# Patient Record
Sex: MALE | Race: OTHER | Employment: OTHER | URBAN - METROPOLITAN AREA
[De-identification: names, ages, dates, MRNs, and addresses within clinical notes are randomized per-mention and may not be internally consistent; named-entity substitution may affect disease eponyms.]

---

## 2020-01-01 ENCOUNTER — HOSPITAL ENCOUNTER (EMERGENCY)
Facility: HOSPITAL | Age: 85
Discharge: HOME | End: 2020-01-01
Attending: EMERGENCY MEDICINE
Payer: MEDICARE

## 2020-01-01 VITALS
DIASTOLIC BLOOD PRESSURE: 70 MMHG | TEMPERATURE: 99 F | BODY MASS INDEX: 36 KG/M2 | WEIGHT: 222 LBS | RESPIRATION RATE: 20 BRPM | OXYGEN SATURATION: 97 % | SYSTOLIC BLOOD PRESSURE: 162 MMHG | HEART RATE: 69 BPM

## 2020-01-01 DIAGNOSIS — N30.00 ACUTE CYSTITIS WITHOUT HEMATURIA: Primary | ICD-10-CM

## 2020-01-01 PROCEDURE — 99284 EMERGENCY DEPT VISIT MOD MDM: CPT

## 2020-01-01 PROCEDURE — 51702 INSERT TEMP BLADDER CATH: CPT

## 2020-01-01 PROCEDURE — 87184 SC STD DISK METHOD PER PLATE: CPT | Performed by: EMERGENCY MEDICINE

## 2020-01-01 PROCEDURE — 87186 SC STD MICRODIL/AGAR DIL: CPT | Performed by: EMERGENCY MEDICINE

## 2020-01-01 PROCEDURE — 87077 CULTURE AEROBIC IDENTIFY: CPT | Performed by: EMERGENCY MEDICINE

## 2020-01-01 PROCEDURE — 81001 URINALYSIS AUTO W/SCOPE: CPT | Performed by: EMERGENCY MEDICINE

## 2020-01-01 PROCEDURE — 87086 URINE CULTURE/COLONY COUNT: CPT | Performed by: EMERGENCY MEDICINE

## 2020-01-01 RX ORDER — CEPHALEXIN 500 MG/1
500 CAPSULE ORAL 2 TIMES DAILY
Qty: 10 CAPSULE | Refills: 0 | Status: SHIPPED | OUTPATIENT
Start: 2020-01-01 | End: 2020-01-01

## 2020-06-25 ENCOUNTER — HOSPITAL ENCOUNTER (EMERGENCY)
Facility: HOSPITAL | Age: 85
Discharge: HOME | End: 2020-06-25
Attending: EMERGENCY MEDICINE
Payer: MEDICARE

## 2020-06-25 VITALS
HEIGHT: 66 IN | DIASTOLIC BLOOD PRESSURE: 67 MMHG | BODY MASS INDEX: 33.75 KG/M2 | TEMPERATURE: 98 F | SYSTOLIC BLOOD PRESSURE: 119 MMHG | WEIGHT: 210 LBS | HEART RATE: 62 BPM | RESPIRATION RATE: 18 BRPM | OXYGEN SATURATION: 96 %

## 2020-06-25 DIAGNOSIS — R31.9 HEMATURIA, UNSPECIFIED TYPE: Primary | ICD-10-CM

## 2020-06-25 DIAGNOSIS — T83.9XXA PROBLEM WITH FOLEY CATHETER, INITIAL ENCOUNTER (HCC): ICD-10-CM

## 2020-06-25 DIAGNOSIS — N30.01 ACUTE CYSTITIS WITH HEMATURIA: ICD-10-CM

## 2020-06-25 PROCEDURE — 99284 EMERGENCY DEPT VISIT MOD MDM: CPT

## 2020-06-25 PROCEDURE — 51702 INSERT TEMP BLADDER CATH: CPT

## 2020-06-25 PROCEDURE — 87086 URINE CULTURE/COLONY COUNT: CPT | Performed by: EMERGENCY MEDICINE

## 2020-06-25 PROCEDURE — 81001 URINALYSIS AUTO W/SCOPE: CPT | Performed by: EMERGENCY MEDICINE

## 2020-06-25 RX ORDER — CEPHALEXIN 500 MG/1
500 CAPSULE ORAL ONCE
Status: COMPLETED | OUTPATIENT
Start: 2020-06-25 | End: 2020-06-25

## 2020-06-25 RX ORDER — CEPHALEXIN 500 MG/1
500 CAPSULE ORAL 4 TIMES DAILY
Qty: 28 CAPSULE | Refills: 0 | Status: SHIPPED | OUTPATIENT
Start: 2020-06-25 | End: 2020-07-02

## 2020-06-25 NOTE — ED NOTES
family report that he is unable to swallow pills, applesauce ordered from kitchen then will give kelfex.

## 2020-06-25 NOTE — ED NOTES
Attempted to flush marcum catheter with 40ml and was only able to withdraw 10ml. +pain. md aware. vo to replace marcum catheter.

## 2020-06-25 NOTE — ED PROVIDER NOTES
Patient Seen in: [facility] AND [facility] Emergency Department      History   Patient presents with:  Hematuria    Stated Complaint: hematuria    HPI    60-year-old male with history of CHF, renal disorder, chronic indwelling Carrasco catheter, last changed 1 week Temp src Oral   SpO2 96 %   O2 Device None (Room air)       Current:/63   Pulse 64   Temp 97.7 °F (36.5 °C) (Oral)   Resp 18   Ht 167.6 cm (5' 6\")   Wt 95.3 kg   SpO2 96%   BMI 33.89 kg/m²         Physical Exam  Vitals signs and nursing note revie DEPARTMENT COURSE AND TREATMENT:  Patient's condition was stable during Emergency Department evaluation.      89yoM with intermittent hematuria  - I personally reviewed and interpreted all the ED vitals  - afebrile, hemodynamically stable  - I ordered and p List    START taking these medications    cephALEXin 500 MG Oral Cap  Take 1 capsule (500 mg total) by mouth 4 (four) times daily for 7 days.   Qty: 28 capsule Refills: 0

## 2020-06-25 NOTE — ED NOTES
Marcum catheter that pt arrived with was dc'd. Balloon was only inflated with 2ml of fluid and marcum appeared to be not inserted fully. New marcum catheter inserted and drained 400ml of pink tinged urine. md aware.

## 2020-07-17 ENCOUNTER — HOSPITAL ENCOUNTER (EMERGENCY)
Facility: HOSPITAL | Age: 85
Discharge: HOME | End: 2020-07-17
Attending: EMERGENCY MEDICINE
Payer: MEDICARE

## 2020-07-17 VITALS
OXYGEN SATURATION: 96 % | DIASTOLIC BLOOD PRESSURE: 64 MMHG | TEMPERATURE: 97 F | HEART RATE: 66 BPM | RESPIRATION RATE: 18 BRPM | SYSTOLIC BLOOD PRESSURE: 136 MMHG

## 2020-07-17 DIAGNOSIS — T83.011A MALFUNCTION OF FOLEY CATHETER, INITIAL ENCOUNTER (HCC): Primary | ICD-10-CM

## 2020-07-17 PROCEDURE — 51702 INSERT TEMP BLADDER CATH: CPT

## 2020-07-17 PROCEDURE — 99283 EMERGENCY DEPT VISIT LOW MDM: CPT

## 2020-07-17 NOTE — CM/SW NOTE
[address] [place] [address] [phone]     Spoke with Rn supervisor Annmarie regarding the ER visits related to marcum catheter

## 2020-07-17 NOTE — ED INITIAL ASSESSMENT (HPI)
Carrasco changed by home health nurse. Bleeding noted in drainage bag shortly after. Patient c/o pain to his penis.

## 2020-07-20 NOTE — ED PROVIDER NOTES
Patient Seen in: [facility] AND [facility] Emergency Department      History   Patient presents with:  Cath Tube Problem    Stated Complaint: blood in marcum    HPI    80year old male with h/o chf, HTN, BPH, chronic indwelling marcum catheter who presents with pa deformity. Skin:     General: Skin is warm and dry. Findings: No rash. Neurological:      Mental Status: He is alert and oriented to person, place, and time.       Coordination: Coordination normal.   Psychiatric:         Behavior: Behavior normal.

## 2020-10-21 NOTE — ED PROVIDER NOTES
Patient Seen in: [facility] AND [facility] Emergency Department      History   Patient presents with:  Cath Tube Problem    Stated Complaint: clogged cath    HPI    35-year-old male with past medical history significant for CHF, chronic kidney disease, presents motion. Neck supple. No tracheal deviation present. CV: s1s2+, RRR, no m/r/g, normal distal pulses  Pulmonary/Chest: CTA b/l with no rales, wheezes. No chest wall tenderness  Abdominal: Mild suprapubic tenderness without rebound or guarding.   Nondistend

## 2020-10-21 NOTE — ED NOTES
[facility] ambulance contacted for patient return back home . [facility] ambulance gave an eta of  30 minutes. Patient and patient's daughter updated.

## 2020-10-21 NOTE — ED NOTES
Waiting on [facility] ambulance for transport back to place of residence. Patient stable, denies any distress .

## 2020-10-21 NOTE — ED NOTES
Patient arrived via ems with an indwelling catheter with yellow urine with sediments in the urine. It was draining upon arrival but was changed by this RN. 200mL of clear, yellow urine returned upon indwelling catheter insertion.

## 2020-10-24 NOTE — PROGRESS NOTES
D/C keflex. Likely colonization. If symptomatic, have return to ED as there are no oral antibiotic options. If doing well, pcp follow up as needed.

## 2021-01-01 ENCOUNTER — APPOINTMENT (OUTPATIENT)
Dept: CT IMAGING | Facility: HOSPITAL | Age: 86
End: 2021-01-01
Attending: EMERGENCY MEDICINE
Payer: MEDICARE

## 2021-01-01 ENCOUNTER — APPOINTMENT (OUTPATIENT)
Dept: INTERVENTIONAL RADIOLOGY/VASCULAR | Facility: HOSPITAL | Age: 86
DRG: 283 | End: 2021-01-01
Attending: INTERNAL MEDICINE
Payer: MEDICARE

## 2021-01-01 ENCOUNTER — HOSPITAL ENCOUNTER (EMERGENCY)
Facility: HOSPITAL | Age: 86
Discharge: HOME | End: 2021-01-01
Attending: EMERGENCY MEDICINE
Payer: MEDICARE

## 2021-01-01 ENCOUNTER — APPOINTMENT (OUTPATIENT)
Dept: GENERAL RADIOLOGY | Facility: HOSPITAL | Age: 86
End: 2021-01-01
Attending: INTERNAL MEDICINE
Payer: MEDICARE

## 2021-01-01 ENCOUNTER — HOSPITAL ENCOUNTER (INPATIENT)
Facility: HOSPITAL | Age: 86
LOS: 4 days | Discharge: HOME | DRG: 682 | End: 2021-01-01
Attending: EMERGENCY MEDICINE | Admitting: INTERNAL MEDICINE
Payer: MEDICARE

## 2021-01-01 ENCOUNTER — APPOINTMENT (OUTPATIENT)
Dept: GENERAL RADIOLOGY | Facility: HOSPITAL | Age: 86
DRG: 283 | End: 2021-01-01
Attending: EMERGENCY MEDICINE
Payer: MEDICARE

## 2021-01-01 ENCOUNTER — APPOINTMENT (OUTPATIENT)
Dept: CV DIAGNOSTICS | Facility: HOSPITAL | Age: 86
DRG: 283 | End: 2021-01-01
Attending: INTERNAL MEDICINE
Payer: MEDICARE

## 2021-01-01 ENCOUNTER — HOSPITAL ENCOUNTER (OUTPATIENT)
Facility: HOSPITAL | Age: 86
Setting detail: OBSERVATION
LOS: 2 days | Discharge: HOME | End: 2021-01-01
Attending: EMERGENCY MEDICINE | Admitting: INTERNAL MEDICINE
Payer: MEDICARE

## 2021-01-01 ENCOUNTER — APPOINTMENT (OUTPATIENT)
Dept: GENERAL RADIOLOGY | Facility: HOSPITAL | Age: 86
DRG: 682 | End: 2021-01-01
Attending: INTERNAL MEDICINE
Payer: MEDICARE

## 2021-01-01 ENCOUNTER — HOSPITAL ENCOUNTER (INPATIENT)
Facility: HOSPITAL | Age: 86
LOS: 1 days | DRG: 283 | End: 2021-01-01
Attending: EMERGENCY MEDICINE | Admitting: INTERNAL MEDICINE
Payer: MEDICARE

## 2021-01-01 ENCOUNTER — APPOINTMENT (OUTPATIENT)
Dept: CT IMAGING | Facility: HOSPITAL | Age: 86
DRG: 682 | End: 2021-01-01
Attending: EMERGENCY MEDICINE
Payer: MEDICARE

## 2021-01-01 VITALS
WEIGHT: 206.69 LBS | RESPIRATION RATE: 24 BRPM | TEMPERATURE: 97 F | HEART RATE: 49 BPM | BODY MASS INDEX: 32 KG/M2 | DIASTOLIC BLOOD PRESSURE: 101 MMHG | OXYGEN SATURATION: 93 % | SYSTOLIC BLOOD PRESSURE: 113 MMHG

## 2021-01-01 VITALS
OXYGEN SATURATION: 97 % | TEMPERATURE: 98 F | HEART RATE: 73 BPM | BODY MASS INDEX: 27.46 KG/M2 | DIASTOLIC BLOOD PRESSURE: 61 MMHG | SYSTOLIC BLOOD PRESSURE: 133 MMHG | HEIGHT: 70 IN | WEIGHT: 191.81 LBS | RESPIRATION RATE: 18 BRPM

## 2021-01-01 VITALS
HEART RATE: 63 BPM | OXYGEN SATURATION: 97 % | TEMPERATURE: 97 F | DIASTOLIC BLOOD PRESSURE: 64 MMHG | SYSTOLIC BLOOD PRESSURE: 154 MMHG | RESPIRATION RATE: 18 BRPM

## 2021-01-01 VITALS
OXYGEN SATURATION: 96 % | HEIGHT: 68 IN | SYSTOLIC BLOOD PRESSURE: 132 MMHG | RESPIRATION RATE: 20 BRPM | WEIGHT: 194.88 LBS | BODY MASS INDEX: 29.54 KG/M2 | DIASTOLIC BLOOD PRESSURE: 54 MMHG | HEART RATE: 72 BPM | TEMPERATURE: 98 F

## 2021-01-01 DIAGNOSIS — E87.1 HYPONATREMIA: ICD-10-CM

## 2021-01-01 DIAGNOSIS — N30.80 EMPHYSEMATOUS CYSTITIS: Primary | ICD-10-CM

## 2021-01-01 DIAGNOSIS — R10.9 ABDOMINAL PAIN, ACUTE: ICD-10-CM

## 2021-01-01 DIAGNOSIS — I21.9 ACUTE MYOCARDIAL INFARCTION, UNSPECIFIED MI TYPE, UNSPECIFIED ARTERY (HCC): Primary | ICD-10-CM

## 2021-01-01 DIAGNOSIS — E87.1 HYPONATREMIA: Primary | ICD-10-CM

## 2021-01-01 DIAGNOSIS — N39.0 URINARY TRACT INFECTION ASSOCIATED WITH INDWELLING URETHRAL CATHETER, INITIAL ENCOUNTER (HCC): Primary | ICD-10-CM

## 2021-01-01 DIAGNOSIS — T83.511A URINARY TRACT INFECTION ASSOCIATED WITH INDWELLING URETHRAL CATHETER, INITIAL ENCOUNTER (HCC): Primary | ICD-10-CM

## 2021-01-01 DIAGNOSIS — I50.9 CONGESTIVE HEART FAILURE, UNSPECIFIED HF CHRONICITY, UNSPECIFIED HEART FAILURE TYPE (HCC): ICD-10-CM

## 2021-01-01 DIAGNOSIS — E87.5 HYPERKALEMIA: ICD-10-CM

## 2021-01-01 LAB
ALBUMIN SERPL-MCNC: 2.4 G/DL (ref 3.4–5)
ALBUMIN SERPL-MCNC: 2.6 G/DL (ref 3.4–5)
ALBUMIN SERPL-MCNC: 2.8 G/DL (ref 3.4–5)
ALBUMIN SERPL-MCNC: 2.8 G/DL (ref 3.4–5)
ALBUMIN SERPL-MCNC: 2.9 G/DL (ref 3.4–5)
ALBUMIN SERPL-MCNC: 3.1 G/DL (ref 3.4–5)
ALBUMIN/GLOB SERPL: 0.6 {RATIO} (ref 1–2)
ALP LIVER SERPL-CCNC: 146 U/L
ALP LIVER SERPL-CCNC: 155 U/L
ALT SERPL-CCNC: 13 U/L
ALT SERPL-CCNC: 57 U/L
ANION GAP SERPL CALC-SCNC: 10 MMOL/L (ref 0–18)
ANION GAP SERPL CALC-SCNC: 15 MMOL/L (ref 0–18)
ANION GAP SERPL CALC-SCNC: 5 MMOL/L (ref 0–18)
ANION GAP SERPL CALC-SCNC: 6 MMOL/L (ref 0–18)
ANION GAP SERPL CALC-SCNC: 6 MMOL/L (ref 0–18)
ANION GAP SERPL CALC-SCNC: 7 MMOL/L (ref 0–18)
ANION GAP SERPL CALC-SCNC: 8 MMOL/L (ref 0–18)
ANION GAP SERPL CALC-SCNC: 9 MMOL/L (ref 0–18)
APTT PPP: 103.3 SECONDS (ref 23.2–35.3)
APTT PPP: 159.8 SECONDS (ref 23.2–35.3)
APTT PPP: 34.5 SECONDS (ref 23.2–35.3)
APTT PPP: 60.9 SECONDS (ref 23.2–35.3)
AST SERPL-CCNC: 17 U/L (ref 15–37)
AST SERPL-CCNC: 60 U/L (ref 15–37)
BASOPHILS # BLD AUTO: 0.02 X10(3) UL (ref 0–0.2)
BASOPHILS # BLD AUTO: 0.03 X10(3) UL (ref 0–0.2)
BASOPHILS NFR BLD AUTO: 0.3 %
BASOPHILS NFR BLD AUTO: 0.4 %
BILIRUB DIRECT SERPL-MCNC: <0.1 MG/DL (ref 0–0.2)
BILIRUB SERPL-MCNC: 0.3 MG/DL (ref 0.1–2)
BILIRUB SERPL-MCNC: 0.4 MG/DL (ref 0.1–2)
BILIRUB UR QL: NEGATIVE
BUN BLD-MCNC: 39 MG/DL (ref 7–18)
BUN BLD-MCNC: 40 MG/DL (ref 7–18)
BUN BLD-MCNC: 41 MG/DL (ref 7–18)
BUN BLD-MCNC: 43 MG/DL (ref 7–18)
BUN BLD-MCNC: 46 MG/DL (ref 7–18)
BUN BLD-MCNC: 46 MG/DL (ref 7–18)
BUN BLD-MCNC: 47 MG/DL (ref 7–18)
BUN BLD-MCNC: 47 MG/DL (ref 7–18)
BUN BLD-MCNC: 51 MG/DL (ref 7–18)
BUN/CREAT SERPL: 21.7 (ref 10–20)
BUN/CREAT SERPL: 22.9 (ref 10–20)
BUN/CREAT SERPL: 23.5 (ref 10–20)
BUN/CREAT SERPL: 23.9 (ref 10–20)
BUN/CREAT SERPL: 25.3 (ref 10–20)
BUN/CREAT SERPL: 25.6 (ref 10–20)
BUN/CREAT SERPL: 25.7 (ref 10–20)
BUN/CREAT SERPL: 26.4 (ref 10–20)
BUN/CREAT SERPL: 32.4 (ref 10–20)
BUN/CREAT SERPL: 36.4 (ref 10–20)
BUN/CREAT SERPL: 37.3 (ref 10–20)
BUN/CREAT SERPL: 42 (ref 10–20)
CALCIUM BLD-MCNC: 8.3 MG/DL (ref 8.5–10.1)
CALCIUM BLD-MCNC: 8.3 MG/DL (ref 8.5–10.1)
CALCIUM BLD-MCNC: 8.4 MG/DL (ref 8.5–10.1)
CALCIUM BLD-MCNC: 8.5 MG/DL (ref 8.5–10.1)
CALCIUM BLD-MCNC: 8.8 MG/DL (ref 8.5–10.1)
CALCIUM BLD-MCNC: 8.9 MG/DL (ref 8.5–10.1)
CALCIUM BLD-MCNC: 8.9 MG/DL (ref 8.5–10.1)
CALCIUM BLD-MCNC: 9 MG/DL (ref 8.5–10.1)
CHLORIDE SERPL-SCNC: 85 MMOL/L (ref 98–112)
CHLORIDE SERPL-SCNC: 85 MMOL/L (ref 98–112)
CHLORIDE SERPL-SCNC: 87 MMOL/L (ref 98–112)
CHLORIDE SERPL-SCNC: 87 MMOL/L (ref 98–112)
CHLORIDE SERPL-SCNC: 88 MMOL/L (ref 98–112)
CHLORIDE SERPL-SCNC: 88 MMOL/L (ref 98–112)
CHLORIDE SERPL-SCNC: 90 MMOL/L (ref 98–112)
CHLORIDE SERPL-SCNC: 91 MMOL/L (ref 98–112)
CHLORIDE SERPL-SCNC: 93 MMOL/L (ref 98–112)
CHLORIDE SERPL-SCNC: 96 MMOL/L (ref 98–112)
CHLORIDE SERPL-SCNC: 96 MMOL/L (ref 98–112)
CHLORIDE SERPL-SCNC: 97 MMOL/L (ref 98–112)
CHLORIDE UR-SCNC: 28 MMOL/L
CHOLEST SMN-MCNC: 156 MG/DL (ref ?–200)
CLARITY UR: CLEAR
CO2 SERPL-SCNC: 20 MMOL/L (ref 21–32)
CO2 SERPL-SCNC: 22 MMOL/L (ref 21–32)
CO2 SERPL-SCNC: 24 MMOL/L (ref 21–32)
CO2 SERPL-SCNC: 25 MMOL/L (ref 21–32)
CO2 SERPL-SCNC: 25 MMOL/L (ref 21–32)
CO2 SERPL-SCNC: 26 MMOL/L (ref 21–32)
CO2 SERPL-SCNC: 27 MMOL/L (ref 21–32)
CO2 SERPL-SCNC: 27 MMOL/L (ref 21–32)
CO2 SERPL-SCNC: 29 MMOL/L (ref 21–32)
COLOR UR: YELLOW
CORTIS SERPL-MCNC: 30.6 UG/DL
CREAT BLD-MCNC: 1.12 MG/DL
CREAT BLD-MCNC: 1.26 MG/DL
CREAT BLD-MCNC: 1.4 MG/DL
CREAT BLD-MCNC: 1.42 MG/DL
CREAT BLD-MCNC: 1.63 MG/DL
CREAT BLD-MCNC: 1.63 MG/DL
CREAT BLD-MCNC: 1.67 MG/DL
CREAT BLD-MCNC: 1.68 MG/DL
CREAT BLD-MCNC: 1.7 MG/DL
CREAT BLD-MCNC: 1.82 MG/DL
CREAT BLD-MCNC: 1.88 MG/DL
CREAT BLD-MCNC: 1.89 MG/DL
CREAT UR-SCNC: 57.2 MG/DL
DEPRECATED RDW RBC AUTO: 41.2 FL (ref 35.1–46.3)
DEPRECATED RDW RBC AUTO: 41.2 FL (ref 35.1–46.3)
DEPRECATED RDW RBC AUTO: 44.1 FL (ref 35.1–46.3)
DEPRECATED RDW RBC AUTO: 44.7 FL (ref 35.1–46.3)
EOSINOPHIL # BLD AUTO: 0.03 X10(3) UL (ref 0–0.7)
EOSINOPHIL # BLD AUTO: 0.04 X10(3) UL (ref 0–0.7)
EOSINOPHIL # BLD AUTO: 0.12 X10(3) UL (ref 0–0.7)
EOSINOPHIL # BLD AUTO: 0.2 X10(3) UL (ref 0–0.7)
EOSINOPHIL NFR BLD AUTO: 0.4 %
EOSINOPHIL NFR BLD AUTO: 0.6 %
EOSINOPHIL NFR BLD AUTO: 1.5 %
EOSINOPHIL NFR BLD AUTO: 2.4 %
ERYTHROCYTE [DISTWIDTH] IN BLOOD BY AUTOMATED COUNT: 12.1 % (ref 11–15)
ERYTHROCYTE [DISTWIDTH] IN BLOOD BY AUTOMATED COUNT: 12.3 % (ref 11–15)
ERYTHROCYTE [DISTWIDTH] IN BLOOD BY AUTOMATED COUNT: 12.9 % (ref 11–15)
ERYTHROCYTE [DISTWIDTH] IN BLOOD BY AUTOMATED COUNT: 12.9 % (ref 11–15)
GLOBULIN PLAS-MCNC: 4.8 G/DL (ref 2.8–4.4)
GLUCOSE BLD-MCNC: 106 MG/DL (ref 70–99)
GLUCOSE BLD-MCNC: 107 MG/DL (ref 70–99)
GLUCOSE BLD-MCNC: 118 MG/DL (ref 70–99)
GLUCOSE BLD-MCNC: 119 MG/DL (ref 70–99)
GLUCOSE BLD-MCNC: 121 MG/DL (ref 70–99)
GLUCOSE BLD-MCNC: 190 MG/DL (ref 70–99)
GLUCOSE BLD-MCNC: 79 MG/DL (ref 70–99)
GLUCOSE BLD-MCNC: 82 MG/DL (ref 70–99)
GLUCOSE BLD-MCNC: 90 MG/DL (ref 70–99)
GLUCOSE BLD-MCNC: 91 MG/DL (ref 70–99)
GLUCOSE BLD-MCNC: 96 MG/DL (ref 70–99)
GLUCOSE BLD-MCNC: 97 MG/DL (ref 70–99)
GLUCOSE UR-MCNC: NEGATIVE MG/DL
GRAN CASTS #/AREA URNS LPF: PRESENT /LPF
HAV IGM SER QL: 2.2 MG/DL (ref 1.6–2.6)
HAV IGM SER QL: 2.2 MG/DL (ref 1.6–2.6)
HAV IGM SER QL: 2.3 MG/DL (ref 1.6–2.6)
HAV IGM SER QL: 2.3 MG/DL (ref 1.6–2.6)
HAV IGM SER QL: 2.4 MG/DL (ref 1.6–2.6)
HCT VFR BLD AUTO: 29.1 %
HCT VFR BLD AUTO: 31.7 %
HCT VFR BLD AUTO: 32.9 %
HCT VFR BLD AUTO: 34.7 %
HDLC SERPL-MCNC: 57 MG/DL (ref 40–59)
HGB BLD-MCNC: 10.8 G/DL
HGB BLD-MCNC: 11.5 G/DL
HGB BLD-MCNC: 12 G/DL
HGB BLD-MCNC: 9.7 G/DL
HGB UR QL STRIP.AUTO: NEGATIVE
HYALINE CASTS #/AREA URNS AUTO: PRESENT /LPF
IMM GRANULOCYTES # BLD AUTO: 0.03 X10(3) UL (ref 0–1)
IMM GRANULOCYTES # BLD AUTO: 0.03 X10(3) UL (ref 0–1)
IMM GRANULOCYTES # BLD AUTO: 0.05 X10(3) UL (ref 0–1)
IMM GRANULOCYTES # BLD AUTO: 0.07 X10(3) UL (ref 0–1)
IMM GRANULOCYTES NFR BLD: 0.4 %
IMM GRANULOCYTES NFR BLD: 0.4 %
IMM GRANULOCYTES NFR BLD: 0.7 %
IMM GRANULOCYTES NFR BLD: 0.9 %
KETONES UR-MCNC: NEGATIVE MG/DL
LACTATE SERPL-SCNC: 2 MMOL/L (ref 0.4–2)
LACTATE SERPL-SCNC: 2.5 MMOL/L (ref 0.4–2)
LACTATE SERPL-SCNC: 3.7 MMOL/L (ref 0.4–2)
LDLC SERPL CALC-MCNC: 88 MG/DL (ref ?–100)
LIPASE SERPL-CCNC: 73 U/L (ref 73–393)
LYMPHOCYTES # BLD AUTO: 0.91 X10(3) UL (ref 1–4)
LYMPHOCYTES # BLD AUTO: 1.05 X10(3) UL (ref 1–4)
LYMPHOCYTES # BLD AUTO: 1.61 X10(3) UL (ref 1–4)
LYMPHOCYTES # BLD AUTO: 1.74 X10(3) UL (ref 1–4)
LYMPHOCYTES NFR BLD AUTO: 13.4 %
LYMPHOCYTES NFR BLD AUTO: 13.6 %
LYMPHOCYTES NFR BLD AUTO: 20.7 %
LYMPHOCYTES NFR BLD AUTO: 22.7 %
M PROTEIN MFR SERPL ELPH: 7.1 G/DL (ref 6.4–8.2)
M PROTEIN MFR SERPL ELPH: 7.9 G/DL (ref 6.4–8.2)
MCH RBC QN AUTO: 31.1 PG (ref 26–34)
MCH RBC QN AUTO: 32 PG (ref 26–34)
MCH RBC QN AUTO: 32.3 PG (ref 26–34)
MCH RBC QN AUTO: 32.6 PG (ref 26–34)
MCHC RBC AUTO-ENTMCNC: 33.3 G/DL (ref 31–37)
MCHC RBC AUTO-ENTMCNC: 34.1 G/DL (ref 31–37)
MCHC RBC AUTO-ENTMCNC: 34.6 G/DL (ref 31–37)
MCHC RBC AUTO-ENTMCNC: 35 G/DL (ref 31–37)
MCV RBC AUTO: 92.5 FL
MCV RBC AUTO: 93.2 FL
MCV RBC AUTO: 93.3 FL
MCV RBC AUTO: 94.9 FL
MONOCYTES # BLD AUTO: 0.53 X10(3) UL (ref 0.1–1)
MONOCYTES # BLD AUTO: 0.54 X10(3) UL (ref 0.1–1)
MONOCYTES # BLD AUTO: 0.81 X10(3) UL (ref 0.1–1)
MONOCYTES # BLD AUTO: 0.86 X10(3) UL (ref 0.1–1)
MONOCYTES NFR BLD AUTO: 11.4 %
MONOCYTES NFR BLD AUTO: 12.9 %
MONOCYTES NFR BLD AUTO: 6.4 %
MONOCYTES NFR BLD AUTO: 6.8 %
NEUTROPHILS # BLD AUTO: 4.56 X10 (3) UL (ref 1.5–7.7)
NEUTROPHILS # BLD AUTO: 4.56 X10(3) UL (ref 1.5–7.7)
NEUTROPHILS # BLD AUTO: 4.83 X10 (3) UL (ref 1.5–7.7)
NEUTROPHILS # BLD AUTO: 4.83 X10(3) UL (ref 1.5–7.7)
NEUTROPHILS # BLD AUTO: 5.86 X10 (3) UL (ref 1.5–7.7)
NEUTROPHILS # BLD AUTO: 5.86 X10(3) UL (ref 1.5–7.7)
NEUTROPHILS # BLD AUTO: 6.05 X10 (3) UL (ref 1.5–7.7)
NEUTROPHILS # BLD AUTO: 6.05 X10(3) UL (ref 1.5–7.7)
NEUTROPHILS NFR BLD AUTO: 64.4 %
NEUTROPHILS NFR BLD AUTO: 69.7 %
NEUTROPHILS NFR BLD AUTO: 72.2 %
NEUTROPHILS NFR BLD AUTO: 77 %
NITRITE UR QL STRIP.AUTO: NEGATIVE
NITRITE UR QL STRIP.AUTO: NEGATIVE
NITRITE UR QL STRIP.AUTO: POSITIVE
NONHDLC SERPL-MCNC: 99 MG/DL (ref ?–130)
NT-PROBNP SERPL-MCNC: 7069 PG/ML (ref ?–450)
NT-PROBNP SERPL-MCNC: ABNORMAL PG/ML (ref ?–450)
OSMOLALITY SERPL CALC.SUM OF ELEC: 257 MOSM/KG (ref 275–295)
OSMOLALITY SERPL CALC.SUM OF ELEC: 261 MOSM/KG (ref 275–295)
OSMOLALITY SERPL CALC.SUM OF ELEC: 262 MOSM/KG (ref 275–295)
OSMOLALITY SERPL CALC.SUM OF ELEC: 264 MOSM/KG (ref 275–295)
OSMOLALITY SERPL CALC.SUM OF ELEC: 265 MOSM/KG (ref 275–295)
OSMOLALITY SERPL CALC.SUM OF ELEC: 265 MOSM/KG (ref 275–295)
OSMOLALITY SERPL CALC.SUM OF ELEC: 267 MOSM/KG (ref 275–295)
OSMOLALITY SERPL CALC.SUM OF ELEC: 268 MOSM/KG (ref 275–295)
OSMOLALITY SERPL CALC.SUM OF ELEC: 273 MOSM/KG (ref 275–295)
OSMOLALITY SERPL CALC.SUM OF ELEC: 276 MOSM/KG (ref 275–295)
OSMOLALITY SERPL CALC.SUM OF ELEC: 279 MOSM/KG (ref 275–295)
OSMOLALITY SERPL CALC.SUM OF ELEC: 283 MOSM/KG (ref 275–295)
OSMOLALITY SERPL: 258 MOSM/KG (ref 275–295)
OSMOLALITY UR: 294 MOSM/KG (ref 300–1100)
OSMOLALITY UR: 304 MOSM/KG (ref 300–1100)
PH UR: 5 [PH] (ref 5–8)
PH UR: 6 [PH] (ref 5–8)
PH UR: 7 [PH] (ref 5–8)
PHOSPHATE SERPL-MCNC: 2.9 MG/DL (ref 2.5–4.9)
PHOSPHATE SERPL-MCNC: 2.9 MG/DL (ref 2.5–4.9)
PHOSPHATE SERPL-MCNC: 3 MG/DL (ref 2.5–4.9)
PHOSPHATE SERPL-MCNC: 3 MG/DL (ref 2.5–4.9)
PLATELET # BLD AUTO: 201 10(3)UL (ref 150–450)
PLATELET # BLD AUTO: 230 10(3)UL (ref 150–450)
PLATELET # BLD AUTO: 240 10(3)UL (ref 150–450)
PLATELET # BLD AUTO: 249 10(3)UL (ref 150–450)
POTASSIUM SERPL-SCNC: 4.3 MMOL/L (ref 3.5–5.1)
POTASSIUM SERPL-SCNC: 4.3 MMOL/L (ref 3.5–5.1)
POTASSIUM SERPL-SCNC: 4.4 MMOL/L (ref 3.5–5.1)
POTASSIUM SERPL-SCNC: 4.5 MMOL/L (ref 3.5–5.1)
POTASSIUM SERPL-SCNC: 5 MMOL/L (ref 3.5–5.1)
POTASSIUM SERPL-SCNC: 5.1 MMOL/L (ref 3.5–5.1)
POTASSIUM SERPL-SCNC: 5.1 MMOL/L (ref 3.5–5.1)
POTASSIUM SERPL-SCNC: 5.4 MMOL/L (ref 3.5–5.1)
POTASSIUM SERPL-SCNC: 5.5 MMOL/L (ref 3.5–5.1)
POTASSIUM UR-SCNC: 42.9 MMOL/L
PROT UR-MCNC: 30 MG/DL
PROT UR-MCNC: >=500 MG/DL
PROT UR-MCNC: NEGATIVE MG/DL
RBC # BLD AUTO: 3.12 X10(6)UL
RBC # BLD AUTO: 3.34 X10(6)UL
RBC # BLD AUTO: 3.53 X10(6)UL
RBC # BLD AUTO: 3.75 X10(6)UL
RBC #/AREA URNS AUTO: >10 /HPF
SARS-COV-2 RNA RESP QL NAA+PROBE: NOT DETECTED
SODIUM SERPL-SCNC: 118 MMOL/L (ref 136–145)
SODIUM SERPL-SCNC: 12 MMOL/L
SODIUM SERPL-SCNC: 120 MMOL/L (ref 136–145)
SODIUM SERPL-SCNC: 121 MMOL/L (ref 136–145)
SODIUM SERPL-SCNC: 122 MMOL/L (ref 136–145)
SODIUM SERPL-SCNC: 122 MMOL/L (ref 136–145)
SODIUM SERPL-SCNC: 123 MMOL/L (ref 136–145)
SODIUM SERPL-SCNC: 124 MMOL/L (ref 136–145)
SODIUM SERPL-SCNC: 126 MMOL/L (ref 136–145)
SODIUM SERPL-SCNC: 127 MMOL/L (ref 136–145)
SODIUM SERPL-SCNC: 129 MMOL/L (ref 136–145)
SODIUM SERPL-SCNC: 130 MMOL/L (ref 136–145)
SODIUM SERPL-SCNC: 132 MMOL/L (ref 136–145)
SODIUM SERPL-SCNC: 27 MMOL/L
SODIUM SERPL-SCNC: 27 MMOL/L
SP GR UR STRIP: 1.01 (ref 1–1.03)
TRIGL SERPL-MCNC: 52 MG/DL (ref 30–149)
TROPONIN I SERPL-MCNC: 0.05 NG/ML (ref ?–0.04)
TROPONIN I SERPL-MCNC: 0.07 NG/ML (ref ?–0.04)
TROPONIN I SERPL-MCNC: <0.045 NG/ML (ref ?–0.04)
TSI SER-ACNC: 2.83 MIU/ML (ref 0.36–3.74)
UROBILINOGEN UR STRIP-ACNC: <2
UUN UR-MCNC: 313 MG/DL
VLDLC SERPL CALC-MCNC: 8 MG/DL (ref 0–30)
WBC # BLD AUTO: 6.7 X10(3) UL (ref 4–11)
WBC # BLD AUTO: 7.1 X10(3) UL (ref 4–11)
WBC # BLD AUTO: 7.9 X10(3) UL (ref 4–11)
WBC # BLD AUTO: 8.4 X10(3) UL (ref 4–11)
WBC #/AREA URNS AUTO: >50 /HPF
WBC CLUMPS UR QL AUTO: PRESENT /HPF

## 2021-01-01 PROCEDURE — 84100 ASSAY OF PHOSPHORUS: CPT | Performed by: INTERNAL MEDICINE

## 2021-01-01 PROCEDURE — 80048 BASIC METABOLIC PNL TOTAL CA: CPT | Performed by: INTERNAL MEDICINE

## 2021-01-01 PROCEDURE — 83735 ASSAY OF MAGNESIUM: CPT | Performed by: INTERNAL MEDICINE

## 2021-01-01 PROCEDURE — 93010 ELECTROCARDIOGRAM REPORT: CPT | Performed by: INTERNAL MEDICINE

## 2021-01-01 PROCEDURE — 81001 URINALYSIS AUTO W/SCOPE: CPT | Performed by: INTERNAL MEDICINE

## 2021-01-01 PROCEDURE — 87077 CULTURE AEROBIC IDENTIFY: CPT | Performed by: EMERGENCY MEDICINE

## 2021-01-01 PROCEDURE — 92526 ORAL FUNCTION THERAPY: CPT

## 2021-01-01 PROCEDURE — 85027 COMPLETE CBC AUTOMATED: CPT | Performed by: HOSPITALIST

## 2021-01-01 PROCEDURE — 80069 RENAL FUNCTION PANEL: CPT | Performed by: INTERNAL MEDICINE

## 2021-01-01 PROCEDURE — 84300 ASSAY OF URINE SODIUM: CPT | Performed by: EMERGENCY MEDICINE

## 2021-01-01 PROCEDURE — 96365 THER/PROPH/DIAG IV INF INIT: CPT

## 2021-01-01 PROCEDURE — 84443 ASSAY THYROID STIM HORMONE: CPT | Performed by: INTERNAL MEDICINE

## 2021-01-01 PROCEDURE — 85025 COMPLETE CBC W/AUTO DIFF WBC: CPT | Performed by: EMERGENCY MEDICINE

## 2021-01-01 PROCEDURE — 83930 ASSAY OF BLOOD OSMOLALITY: CPT | Performed by: INTERNAL MEDICINE

## 2021-01-01 PROCEDURE — 99285 EMERGENCY DEPT VISIT HI MDM: CPT

## 2021-01-01 PROCEDURE — 97162 PT EVAL MOD COMPLEX 30 MIN: CPT

## 2021-01-01 PROCEDURE — 84295 ASSAY OF SERUM SODIUM: CPT | Performed by: INTERNAL MEDICINE

## 2021-01-01 PROCEDURE — 87086 URINE CULTURE/COLONY COUNT: CPT | Performed by: EMERGENCY MEDICINE

## 2021-01-01 PROCEDURE — 94640 AIRWAY INHALATION TREATMENT: CPT

## 2021-01-01 PROCEDURE — 99222 1ST HOSP IP/OBS MODERATE 55: CPT | Performed by: NURSE PRACTITIONER

## 2021-01-01 PROCEDURE — 74176 CT ABD & PELVIS W/O CONTRAST: CPT | Performed by: EMERGENCY MEDICINE

## 2021-01-01 PROCEDURE — 93306 TTE W/DOPPLER COMPLETE: CPT | Performed by: INTERNAL MEDICINE

## 2021-01-01 PROCEDURE — 82436 ASSAY OF URINE CHLORIDE: CPT | Performed by: EMERGENCY MEDICINE

## 2021-01-01 PROCEDURE — 71045 X-RAY EXAM CHEST 1 VIEW: CPT | Performed by: INTERNAL MEDICINE

## 2021-01-01 PROCEDURE — 71045 X-RAY EXAM CHEST 1 VIEW: CPT | Performed by: EMERGENCY MEDICINE

## 2021-01-01 PROCEDURE — 93005 ELECTROCARDIOGRAM TRACING: CPT

## 2021-01-01 PROCEDURE — 80048 BASIC METABOLIC PNL TOTAL CA: CPT | Performed by: HOSPITALIST

## 2021-01-01 PROCEDURE — 84133 ASSAY OF URINE POTASSIUM: CPT | Performed by: EMERGENCY MEDICINE

## 2021-01-01 PROCEDURE — 97530 THERAPEUTIC ACTIVITIES: CPT

## 2021-01-01 PROCEDURE — 82533 TOTAL CORTISOL: CPT | Performed by: INTERNAL MEDICINE

## 2021-01-01 PROCEDURE — 92610 EVALUATE SWALLOWING FUNCTION: CPT

## 2021-01-01 PROCEDURE — 93010 ELECTROCARDIOGRAM REPORT: CPT | Performed by: EMERGENCY MEDICINE

## 2021-01-01 PROCEDURE — 96376 TX/PRO/DX INJ SAME DRUG ADON: CPT

## 2021-01-01 PROCEDURE — 36415 COLL VENOUS BLD VENIPUNCTURE: CPT | Performed by: INTERNAL MEDICINE

## 2021-01-01 PROCEDURE — 74177 CT ABD & PELVIS W/CONTRAST: CPT | Performed by: EMERGENCY MEDICINE

## 2021-01-01 PROCEDURE — 81001 URINALYSIS AUTO W/SCOPE: CPT | Performed by: EMERGENCY MEDICINE

## 2021-01-01 PROCEDURE — 83935 ASSAY OF URINE OSMOLALITY: CPT | Performed by: INTERNAL MEDICINE

## 2021-01-01 PROCEDURE — 84100 ASSAY OF PHOSPHORUS: CPT | Performed by: EMERGENCY MEDICINE

## 2021-01-01 PROCEDURE — 83880 ASSAY OF NATRIURETIC PEPTIDE: CPT | Performed by: INTERNAL MEDICINE

## 2021-01-01 PROCEDURE — 87186 SC STD MICRODIL/AGAR DIL: CPT | Performed by: EMERGENCY MEDICINE

## 2021-01-01 PROCEDURE — 83690 ASSAY OF LIPASE: CPT | Performed by: EMERGENCY MEDICINE

## 2021-01-01 PROCEDURE — 96361 HYDRATE IV INFUSION ADD-ON: CPT

## 2021-01-01 PROCEDURE — 96375 TX/PRO/DX INJ NEW DRUG ADDON: CPT

## 2021-01-01 PROCEDURE — 83735 ASSAY OF MAGNESIUM: CPT | Performed by: EMERGENCY MEDICINE

## 2021-01-01 PROCEDURE — 80048 BASIC METABOLIC PNL TOTAL CA: CPT | Performed by: EMERGENCY MEDICINE

## 2021-01-01 PROCEDURE — 80053 COMPREHEN METABOLIC PANEL: CPT | Performed by: EMERGENCY MEDICINE

## 2021-01-01 PROCEDURE — 85025 COMPLETE CBC W/AUTO DIFF WBC: CPT | Performed by: INTERNAL MEDICINE

## 2021-01-01 PROCEDURE — 96374 THER/PROPH/DIAG INJ IV PUSH: CPT

## 2021-01-01 PROCEDURE — 80053 COMPREHEN METABOLIC PANEL: CPT | Performed by: INTERNAL MEDICINE

## 2021-01-01 RX ORDER — PANTOPRAZOLE SODIUM 40 MG/1
40 TABLET, DELAYED RELEASE ORAL
Status: DISCONTINUED | OUTPATIENT
Start: 2021-01-01 | End: 2021-01-01

## 2021-01-01 RX ORDER — SODIUM PHOSPHATE, DIBASIC AND SODIUM PHOSPHATE, MONOBASIC 7; 19 G/133ML; G/133ML
1 ENEMA RECTAL ONCE AS NEEDED
Status: DISCONTINUED | OUTPATIENT
Start: 2021-01-01 | End: 2021-08-11

## 2021-01-01 RX ORDER — FUROSEMIDE 10 MG/ML
40 INJECTION INTRAMUSCULAR; INTRAVENOUS ONCE
Status: COMPLETED | OUTPATIENT
Start: 2021-01-01 | End: 2021-01-01

## 2021-01-01 RX ORDER — DOXEPIN HYDROCHLORIDE 10 MG/1
10 CAPSULE ORAL NIGHTLY
COMMUNITY

## 2021-01-01 RX ORDER — ACETAMINOPHEN 325 MG/1
650 TABLET ORAL EVERY 6 HOURS PRN
Status: DISCONTINUED | OUTPATIENT
Start: 2021-01-01 | End: 2021-01-01

## 2021-01-01 RX ORDER — ASPIRIN 81 MG/1
81 TABLET, CHEWABLE ORAL DAILY
Status: DISCONTINUED | OUTPATIENT
Start: 2021-01-01 | End: 2021-08-11

## 2021-01-01 RX ORDER — IPRATROPIUM BROMIDE AND ALBUTEROL SULFATE 2.5; .5 MG/3ML; MG/3ML
3 SOLUTION RESPIRATORY (INHALATION) EVERY 6 HOURS PRN
Status: DISCONTINUED | OUTPATIENT
Start: 2021-01-01 | End: 2021-01-01

## 2021-01-01 RX ORDER — MIDAZOLAM HYDROCHLORIDE 1 MG/ML
INJECTION INTRAMUSCULAR; INTRAVENOUS
Status: DISCONTINUED
Start: 2021-01-01 | End: 2021-01-01 | Stop reason: WASHOUT

## 2021-01-01 RX ORDER — FUROSEMIDE 10 MG/ML
40 INJECTION INTRAMUSCULAR; INTRAVENOUS DAILY
Status: DISCONTINUED | OUTPATIENT
Start: 2021-01-01 | End: 2021-01-01

## 2021-01-01 RX ORDER — CEFDINIR 300 MG/1
300 CAPSULE ORAL 2 TIMES DAILY
Qty: 20 CAPSULE | Refills: 0 | Status: SHIPPED | OUTPATIENT
Start: 2021-01-01 | End: 2021-01-01

## 2021-01-01 RX ORDER — ARIPIPRAZOLE 5 MG/1
20 TABLET ORAL DAILY
Status: DISCONTINUED | OUTPATIENT
Start: 2021-01-01 | End: 2021-01-01

## 2021-01-01 RX ORDER — SODIUM CHLORIDE 9 MG/ML
INJECTION, SOLUTION INTRAVENOUS CONTINUOUS
Status: DISCONTINUED | OUTPATIENT
Start: 2021-01-01 | End: 2021-01-01

## 2021-01-01 RX ORDER — SENNOSIDES 8.6 MG
8.6 TABLET ORAL DAILY
COMMUNITY

## 2021-01-01 RX ORDER — CLONIDINE HYDROCHLORIDE 0.1 MG/1
0.1 TABLET ORAL AS NEEDED
COMMUNITY

## 2021-01-01 RX ORDER — ONDANSETRON 2 MG/ML
4 INJECTION INTRAMUSCULAR; INTRAVENOUS EVERY 6 HOURS PRN
Status: DISCONTINUED | OUTPATIENT
Start: 2021-01-01 | End: 2021-01-01

## 2021-01-01 RX ORDER — MORPHINE SULFATE 4 MG/ML
2 INJECTION, SOLUTION INTRAMUSCULAR; INTRAVENOUS ONCE
Status: COMPLETED | OUTPATIENT
Start: 2021-01-01 | End: 2021-01-01

## 2021-01-01 RX ORDER — QUETIAPINE 25 MG/1
25 TABLET, FILM COATED ORAL NIGHTLY
Status: DISCONTINUED | OUTPATIENT
Start: 2021-01-01 | End: 2021-01-01

## 2021-01-01 RX ORDER — METOPROLOL TARTRATE 5 MG/5ML
5 INJECTION INTRAVENOUS ONCE
Status: COMPLETED | OUTPATIENT
Start: 2021-01-01 | End: 2021-01-01

## 2021-01-01 RX ORDER — FUROSEMIDE 10 MG/ML
40 INJECTION INTRAMUSCULAR; INTRAVENOUS
Status: DISCONTINUED | OUTPATIENT
Start: 2021-01-01 | End: 2021-08-11

## 2021-01-01 RX ORDER — POLYETHYLENE GLYCOL 3350 17 G/17G
17 POWDER, FOR SOLUTION ORAL DAILY
COMMUNITY

## 2021-01-01 RX ORDER — FLECAINIDE ACETATE 50 MG/1
100 TABLET ORAL DAILY
Status: DISCONTINUED | OUTPATIENT
Start: 2021-01-01 | End: 2021-01-01

## 2021-01-01 RX ORDER — DOXEPIN HYDROCHLORIDE 10 MG/1
10 CAPSULE ORAL NIGHTLY
Status: DISCONTINUED | OUTPATIENT
Start: 2021-01-01 | End: 2021-01-01

## 2021-01-01 RX ORDER — FUROSEMIDE 40 MG/1
40 TABLET ORAL DAILY
Status: DISCONTINUED | OUTPATIENT
Start: 2021-01-01 | End: 2021-01-01

## 2021-01-01 RX ORDER — ONDANSETRON 2 MG/ML
4 INJECTION INTRAMUSCULAR; INTRAVENOUS EVERY 6 HOURS PRN
Status: DISCONTINUED | OUTPATIENT
Start: 2021-01-01 | End: 2021-08-11

## 2021-01-01 RX ORDER — FLECAINIDE ACETATE 100 MG/1
100 TABLET ORAL DAILY
Status: DISCONTINUED | OUTPATIENT
Start: 2021-01-01 | End: 2021-01-01

## 2021-01-01 RX ORDER — PANTOPRAZOLE SODIUM 40 MG/1
40 TABLET, DELAYED RELEASE ORAL
Status: DISCONTINUED | OUTPATIENT
Start: 2021-01-01 | End: 2021-08-11

## 2021-01-01 RX ORDER — QUETIAPINE 25 MG/1
25 TABLET, FILM COATED ORAL NIGHTLY
COMMUNITY

## 2021-01-01 RX ORDER — PANTOPRAZOLE SODIUM 40 MG/1
40 TABLET, DELAYED RELEASE ORAL
COMMUNITY

## 2021-01-01 RX ORDER — FLECAINIDE ACETATE 100 MG/1
100 TABLET ORAL DAILY
COMMUNITY

## 2021-01-01 RX ORDER — MELATONIN
325
Status: DISCONTINUED | OUTPATIENT
Start: 2021-01-01 | End: 2021-01-01

## 2021-01-01 RX ORDER — HEPARIN SODIUM AND DEXTROSE 10000; 5 [USP'U]/100ML; G/100ML
INJECTION INTRAVENOUS CONTINUOUS
Status: DISCONTINUED | OUTPATIENT
Start: 2021-01-01 | End: 2021-01-01

## 2021-01-01 RX ORDER — HEPARIN SODIUM 5000 [USP'U]/ML
5000 INJECTION, SOLUTION INTRAVENOUS; SUBCUTANEOUS EVERY 8 HOURS SCHEDULED
Status: DISCONTINUED | OUTPATIENT
Start: 2021-01-01 | End: 2021-08-11

## 2021-01-01 RX ORDER — HEPARIN SODIUM 1000 [USP'U]/ML
INJECTION, SOLUTION INTRAVENOUS; SUBCUTANEOUS
Status: DISCONTINUED
Start: 2021-01-01 | End: 2021-01-01 | Stop reason: WASHOUT

## 2021-01-01 RX ORDER — METOCLOPRAMIDE HYDROCHLORIDE 5 MG/ML
10 INJECTION INTRAMUSCULAR; INTRAVENOUS EVERY 8 HOURS PRN
Status: DISCONTINUED | OUTPATIENT
Start: 2021-01-01 | End: 2021-01-01

## 2021-01-01 RX ORDER — CLONIDINE HYDROCHLORIDE 0.1 MG/1
0.1 TABLET ORAL 2 TIMES DAILY PRN
Status: DISCONTINUED | OUTPATIENT
Start: 2021-01-01 | End: 2021-08-11

## 2021-01-01 RX ORDER — SODIUM PHOSPHATE, DIBASIC AND SODIUM PHOSPHATE, MONOBASIC 7; 19 G/133ML; G/133ML
1 ENEMA RECTAL ONCE AS NEEDED
Status: DISCONTINUED | OUTPATIENT
Start: 2021-01-01 | End: 2021-01-01

## 2021-01-01 RX ORDER — DEXTROSE MONOHYDRATE 50 MG/ML
INJECTION, SOLUTION INTRAVENOUS CONTINUOUS
Status: DISCONTINUED | OUTPATIENT
Start: 2021-01-01 | End: 2021-01-01

## 2021-01-01 RX ORDER — CEFADROXIL 500 MG/1
500 CAPSULE ORAL 2 TIMES DAILY
Qty: 20 CAPSULE | Refills: 0 | Status: SHIPPED | OUTPATIENT
Start: 2021-01-01 | End: 2021-01-01 | Stop reason: CLARIF

## 2021-01-01 RX ORDER — BISACODYL 10 MG
10 SUPPOSITORY, RECTAL RECTAL
Status: DISCONTINUED | OUTPATIENT
Start: 2021-01-01 | End: 2021-01-01

## 2021-01-01 RX ORDER — IPRATROPIUM BROMIDE AND ALBUTEROL SULFATE 2.5; .5 MG/3ML; MG/3ML
3 SOLUTION RESPIRATORY (INHALATION) EVERY 6 HOURS PRN
Status: DISCONTINUED | OUTPATIENT
Start: 2021-01-01 | End: 2021-08-11

## 2021-01-01 RX ORDER — BISACODYL 10 MG
10 SUPPOSITORY, RECTAL RECTAL
Status: DISCONTINUED | OUTPATIENT
Start: 2021-01-01 | End: 2021-08-11

## 2021-01-01 RX ORDER — ACETAMINOPHEN 325 MG/1
650 TABLET ORAL EVERY 6 HOURS PRN
Status: DISCONTINUED | OUTPATIENT
Start: 2021-01-01 | End: 2021-08-11

## 2021-01-01 RX ORDER — SULFAMETHOXAZOLE AND TRIMETHOPRIM 800; 160 MG/1; MG/1
1 TABLET ORAL 2 TIMES DAILY
Qty: 20 TABLET | Refills: 0 | Status: ON HOLD | OUTPATIENT
Start: 2021-01-01 | End: 2021-01-01

## 2021-01-01 RX ORDER — DOXEPIN HYDROCHLORIDE 10 MG/1
10 CAPSULE ORAL NIGHTLY
Status: DISCONTINUED | OUTPATIENT
Start: 2021-01-01 | End: 2021-08-11

## 2021-01-01 RX ORDER — METOCLOPRAMIDE HYDROCHLORIDE 5 MG/ML
5 INJECTION INTRAMUSCULAR; INTRAVENOUS EVERY 8 HOURS PRN
Status: DISCONTINUED | OUTPATIENT
Start: 2021-01-01 | End: 2021-08-11

## 2021-01-01 RX ORDER — FLECAINIDE ACETATE 100 MG/1
100 TABLET ORAL 2 TIMES DAILY
Status: DISCONTINUED | OUTPATIENT
Start: 2021-01-01 | End: 2021-01-01

## 2021-01-01 RX ORDER — SENNOSIDES 8.6 MG
8.6 TABLET ORAL 2 TIMES DAILY
Status: DISCONTINUED | OUTPATIENT
Start: 2021-01-01 | End: 2021-01-01

## 2021-01-01 RX ORDER — HEPARIN SODIUM 1000 [USP'U]/ML
5000 INJECTION, SOLUTION INTRAVENOUS; SUBCUTANEOUS ONCE
Status: COMPLETED | OUTPATIENT
Start: 2021-01-01 | End: 2021-01-01

## 2021-01-01 RX ORDER — ALBUTEROL SULFATE 90 UG/1
2 AEROSOL, METERED RESPIRATORY (INHALATION) EVERY 4 HOURS PRN
Status: DISCONTINUED | OUTPATIENT
Start: 2021-01-01 | End: 2021-01-01

## 2021-01-01 RX ORDER — HEPARIN SODIUM 5000 [USP'U]/ML
5000 INJECTION, SOLUTION INTRAVENOUS; SUBCUTANEOUS EVERY 8 HOURS SCHEDULED
Status: DISCONTINUED | OUTPATIENT
Start: 2021-01-01 | End: 2021-01-01

## 2021-01-01 RX ORDER — POLYETHYLENE GLYCOL 3350 17 G/17G
17 POWDER, FOR SOLUTION ORAL DAILY PRN
Status: DISCONTINUED | OUTPATIENT
Start: 2021-01-01 | End: 2021-08-11

## 2021-01-01 RX ORDER — ARIPIPRAZOLE 10 MG/1
20 TABLET ORAL DAILY
Status: DISCONTINUED | OUTPATIENT
Start: 2021-01-01 | End: 2021-08-11

## 2021-01-01 RX ORDER — LIDOCAINE HYDROCHLORIDE 20 MG/ML
INJECTION, SOLUTION EPIDURAL; INFILTRATION; INTRACAUDAL; PERINEURAL
Status: COMPLETED
Start: 2021-01-01 | End: 2021-01-01

## 2021-01-01 RX ORDER — DOCUSATE SODIUM 100 MG/1
100 CAPSULE, LIQUID FILLED ORAL 2 TIMES DAILY
Status: DISCONTINUED | OUTPATIENT
Start: 2021-01-01 | End: 2021-01-01

## 2021-01-01 RX ORDER — FUROSEMIDE 40 MG/1
40 TABLET ORAL DAILY
COMMUNITY
End: 2021-01-01

## 2021-01-01 RX ORDER — HEPARIN SODIUM AND DEXTROSE 10000; 5 [USP'U]/100ML; G/100ML
1000 INJECTION INTRAVENOUS ONCE
Status: COMPLETED | OUTPATIENT
Start: 2021-01-01 | End: 2021-01-01

## 2021-01-01 RX ORDER — POLYETHYLENE GLYCOL 3350 17 G/17G
17 POWDER, FOR SOLUTION ORAL DAILY
Status: DISCONTINUED | OUTPATIENT
Start: 2021-01-01 | End: 2021-01-01

## 2021-01-01 RX ORDER — DOCUSATE SODIUM 100 MG/1
100 CAPSULE, LIQUID FILLED ORAL 2 TIMES DAILY
Status: DISCONTINUED | OUTPATIENT
Start: 2021-01-01 | End: 2021-08-11

## 2021-01-01 RX ORDER — CLONIDINE HYDROCHLORIDE 0.1 MG/1
0.1 TABLET ORAL 2 TIMES DAILY
Status: DISCONTINUED | OUTPATIENT
Start: 2021-01-01 | End: 2021-01-01

## 2021-01-01 RX ORDER — MELATONIN
325
COMMUNITY

## 2021-01-01 RX ORDER — QUETIAPINE 25 MG/1
25 TABLET, FILM COATED ORAL NIGHTLY
Status: DISCONTINUED | OUTPATIENT
Start: 2021-01-01 | End: 2021-08-11

## 2021-01-01 RX ORDER — SPIRONOLACTONE 25 MG/1
25 TABLET ORAL DAILY
Status: ON HOLD | COMMUNITY
End: 2021-01-01

## 2021-01-01 RX ORDER — POLYETHYLENE GLYCOL 3350 17 G/17G
17 POWDER, FOR SOLUTION ORAL DAILY PRN
Status: DISCONTINUED | OUTPATIENT
Start: 2021-01-01 | End: 2021-01-01

## 2021-01-01 RX ORDER — ARIPIPRAZOLE 20 MG/1
20 TABLET ORAL DAILY
COMMUNITY

## 2021-01-01 RX ORDER — METOCLOPRAMIDE HYDROCHLORIDE 5 MG/ML
5 INJECTION INTRAMUSCULAR; INTRAVENOUS EVERY 8 HOURS PRN
Status: DISCONTINUED | OUTPATIENT
Start: 2021-01-01 | End: 2021-01-01

## 2021-01-01 RX ORDER — ONDANSETRON 2 MG/ML
4 INJECTION INTRAMUSCULAR; INTRAVENOUS ONCE
Status: COMPLETED | OUTPATIENT
Start: 2021-01-01 | End: 2021-01-01

## 2021-04-24 PROBLEM — E87.1 HYPONATREMIA: Status: ACTIVE | Noted: 2021-01-01

## 2021-04-24 PROBLEM — N30.80 EMPHYSEMATOUS CYSTITIS: Status: ACTIVE | Noted: 2021-01-01

## 2021-04-24 NOTE — ED QUICK NOTES
Patient continues to have pain with red urine in marcum noted. MD made aware and CBI initiated per orders.

## 2021-04-24 NOTE — SLP NOTE
ADULT SWALLOWING EVALUATION    ASSESSMENT    ASSESSMENT/OVERALL IMPRESSION:    PPE REQUIRED. THIS SLP WORE GLOVES AND DROPLET MASK. HANDS SANITIZED/WASHED UPON ENTRANCE/EXIT. This BSE was ordered d/t Pt on modified diet at home.  Pt self-feeds pureed/thi Swallowing precautions written on white board in Pt room. PLAN:    To f/u x2 sessions/meals for dysphagia treatment. Will ensure safe tolerance of diet and reinforce swallowing/aspiration precautions. Will monitor for new CXR results.  Family Rose Galvan impaired  Laryngeal Elevation Strength: Appears impaired     (Please note: Silent aspiration cannot be evaluated clinically.  Videofluoroscopic Swallow Study is required to rule-out silent aspiration.)    GOALS  Goal #1 The patient will tolerate pureed cons

## 2021-04-24 NOTE — ED INITIAL ASSESSMENT (HPI)
Patient arrived via EMS, per spouse patient had marcum cathter placed today, spouse states bleeding began post marcum insertion with home health nurse. Irrigated and blood clot was flushed. Patient denies pain at time. Spouse states bleeding never stopped.  P

## 2021-04-24 NOTE — PLAN OF CARE
Problem: Patient Centered Care  Goal: Patient preferences are identified and integrated in the patient's plan of care  Description: Interventions:  - What would you like us to know as we care for you?  \"I have had the marcum for a while\"  - Provide timel

## 2021-04-24 NOTE — ED PROVIDER NOTES
Patient Seen in: [facility] AND [facility] Emergency Department      History   Patient presents with:  Cath Tube Problem    Stated Complaint: Cath tube issues     HPI/Subjective:   HPI  80 yoM with COPD, enlarged prostate with indwelling marcum cath, presents for Breath sounds: Normal breath sounds. Abdominal:      General: There is no distension. Palpations: Abdomen is soft. Tenderness: There is no abdominal tenderness. There is no right CVA tenderness or left CVA tenderness.    Genitourinary:     Penis for these tests on the individual orders. RAINBOW DRAW BLUE   RAINBOW DRAW LAVENDER   RAINBOW DRAW LIGHT GREEN   RAINBOW DRAW GOLD   URINE CULTURE, ROUTINE                   MDM      Hemodynamically stable in the ED.   He does have gross hematuria on arri

## 2021-04-24 NOTE — ED QUICK NOTES
Orders for admission, patient is aware of plan and ready to go upstairs. Any questions, please call ED GUNJAN Milian  at extension [phone].    Type of COVID test sent:rapid  COVID Suspicion level: Low    Titratable drug(s) infusing:none  Rate:    LOC at time of

## 2021-04-24 NOTE — CONSULTS
[facility] [place] AND CLINICS  [facility] Urology  Consultation Note    Dea Haider Patient Status:  Inpatient    1928 MRN [id number]   Location [facility] 5SW/SE Attending Aditya Taylor MD   Hosp Day # 0 PCP Sarah Snyder MD     Reason for [facility] PEG 3350 (MIRALAX) powder packet 17 g, 17 g, Oral, Daily PRN  •  magnesium hydroxide (MILK OF MAGNESIA) 400 MG/5ML suspension 30 mL, 30 mL, Oral, Daily PRN  •  bisacodyl (DULCOLAX) rectal suppository 10 mg, 10 mg, Rectal, Daily PRN  •  Fleet Enema (FLEET) - considering no clinical symptoms of fever/pain and no elevated WBC recommend observation with no surgical intervention                   - continue broad spec IV abx, await urine cultures / sens                    - hematuria improving with CBI

## 2021-04-24 NOTE — H&P
[facility] Hospitalist H&P     CC: Patient presents with:  Cath Tube Problem     PCP: Maurice Hatch MD    Date of Admission: 4/23/2021 11:18 PM    ASSESSMENT / PLAN:     Mr. 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 Has been getting marcum exchanges with home health RN, had RN exchange marcum yesterday. Started to have pain and hematuria after RN exchanged marcum. No fevers or chills at home.  No abdominal pain prior t Systems  Comprehensive ROS reviewed and negative except for what's stated above. OBJECTIVE:  /40 (BP Location: Left arm)   Pulse 62   Temp 98.4 °F (36.9 °C) (Oral)   Resp 17   Ht 5' 8\" (1.727 m)   Wt 194 lb 14.4 oz (88.4 kg)   SpO2 96%   BMI 29. 7:56 AM          CT ABDOMEN PELVIS IV CONTRAST, NO ORAL (ER)    Result Date: 4/24/2021  CONCLUSION:  1. Bladder is decompressed by Carrasco catheter and demonstrates findings concerning for emphysematous cystitis. No hydronephrosis.  2. Lesser incidental find

## 2021-04-24 NOTE — PROGRESS NOTES
[facility] Pharmacy Note:  Renal Adjustment for cefepime (MAXIPIME)    Kacey Baron is a 80year old patient who has been prescribed cefepime (MAXIPIME) 1gm every 8 hrs. The estimated creatinine clearance is 40 mL/min (based on SCr of 1.14 mg/dL).  The dose has

## 2021-04-24 NOTE — CM/SW NOTE
MDO for dtr requesting  XL commode     Ref placed with HME pending MD cosigned order    / to remain available for support and/or discharge planning.      Adrian Esquivel RN    Ext [phone]

## 2021-04-25 NOTE — PLAN OF CARE
Daughter at bedside with patient overnight. Wheezing at times, duoneb treatment given per RT. Aspiration precautions maintained. Cont'd IV Cefepime. Frequent rounding and turning pt as allowed by daughter. Continue to monitor.     Problem: Patient Centered to call for assistance with activity based on assessment  - Modify environment to reduce risk of injury  - Provide assistive devices as appropriate  - Consider OT/PT consult to assist with strengthening/mobility  - Encourage toileting schedule  Outcome: Pr Absence of urinary retention  Description: INTERVENTIONS:  - Assess patient’s ability to void and empty bladder  - Monitor intake/output and perform bladder scan as needed  - Follow urinary retention protocol/standard of care  - Consider collaborating with

## 2021-04-25 NOTE — PROGRESS NOTES
[facility] Hospitalist Progress Note     CC: Hospital Follow up    PCP: Maria Eugenia Harrison MD       Assessment/Plan:     Principal Problem:    Emphysematous cystitis  Active Problems:    Hyponatremia    Mr. Yelena De La Cruz is a 81 yo M with PMH of COPD, HTN, CHF, prior CVA, temperature source Oral, resp. rate 16, height 5' 8\" (1.727 m), weight 194 lb 14.4 oz (88.4 kg), SpO2 96 %.     Temp:  [97.3 °F (36.3 °C)-98 °F (36.7 °C)] 97.3 °F (36.3 °C)  Pulse:  [55-79] 55  Resp:  [16-18] 16  BP: (102-119)/(34-64) 112/39      Intake/Ou MD BILL on 4/24/2021 at 7:54 AM     Finalized by (CST): Anna Mayberry MD on 4/24/2021 at 7:56 AM          CT ABDOMEN PELVIS IV CONTRAST, NO ORAL (ER)    Result Date: 4/24/2021  CONCLUSION:  1.  Bladder is decompressed by Carrasco catheter and demonstrates findi

## 2021-04-25 NOTE — PROGRESS NOTES
[facility] AND [facility] Urology   Progress Note  Dixie Guan Patient Status:  Inpatient    1928 MRN [id number]   Location [facility] 5SW/SE Attending Grayson Bhatia MD   Hosp Day # 1 PCP Alin Petty MD     Subjective:  No issues over

## 2021-04-25 NOTE — PLAN OF CARE
Problem: Patient Centered Care  Goal: Patient preferences are identified and integrated in the patient's plan of care  Description: Interventions:  - What would you like us to know as we care for you?  \"I have had the marcum for a while\"  - Provide timel FALL  Goal: Free from fall injury  Description: INTERVENTIONS:  - Assess pt frequently for physical needs  - Identify cognitive and physical deficits and behaviors that affect risk of falls.   - [place] fall precautions as indicated by assessment.  - Educ breathe, Incentive Spirometry  - Assess the need for suctioning and perform as needed  - Assess and instruct to report SOB or any respiratory difficulty  - Respiratory Therapy support as indicated  - Manage/alleviate anxiety  - Monitor for signs/symptoms o

## 2021-04-26 NOTE — PROGRESS NOTES
Patient failed inpatient criteria. Second level of review completed and supports observation. UR committee in agreement. Discussed with Dr. Yamile Chua  who approves observation status. MOON given to the patient and order written.     Informed dtr Kika Denson via

## 2021-04-26 NOTE — DISCHARGE SUMMARY
General Medicine Discharge Summary     Patient ID:  Venkata Burns  80year old  9/28/1928    Admit date: 4/23/2021    Discharge date and time: 04/26/21    Attending Physician: Saul Ibarra MD     Primary Care Physician: Thelma Wilson MD     Reason f formulary  - nebs PRN or albuterol inhaler, patient does not like nebulizers  - more upper airway wheezing on exam, lungs clear, no increased work of breathing     HTN  - BP stable, monitor     CHF  - euvolemic  - stop IVF  - continue lasix and spironolact medication changes have been made as below         Medication List      START taking these medications    cefdinir 300 MG Caps  Commonly known as: OMNICEF  Take 1 capsule (300 mg total) by mouth 2 (two) times daily for 10 days.         CONTINUE taking these

## 2021-04-26 NOTE — SLP NOTE
SPEECH DAILY NOTE - INPATIENT    ASSESSMENT & PLAN   ASSESSMENT  PPE REQUIRED. THIS THERAPIST WORE GLOVES, DROPLET MASK, AND GOGGLES FOR DURATION OF TX SESSION. HANDS WASHED UPON ENTRANCE/EXIT.     SLP f/u for ongoing dysphagia tx/meal assessment per recomm Recommendations - Liquids: Nectar thick liquids/ Mildly thick    Compensatory Strategies Recommended: No straws;Small bites and sips  Aspiration Precautions: Upright position; Slow rate;Small bites and sips; No straw  Medication Administration Recommendation

## 2021-04-26 NOTE — CDS QUERY
Dear Doctor Felicia Alanis: To answer this query:   1. Click \"Edit\" button on the toolbar. 2.   Type an \"X\" in the bracket for diagnosis(s) that apply.  (You may also add additional clinical details as you feel necessary to substantiate your response.)   3

## 2021-04-26 NOTE — PLAN OF CARE
Problem: Patient Centered Care  Goal: Patient preferences are identified and integrated in the patient's plan of care  Description: Interventions:  - What would you like us to know as we care for you?  \"I have had the marcum for a while\"  - Provide timel Free from fall injury  Description: INTERVENTIONS:  - Assess pt frequently for physical needs  - Identify cognitive and physical deficits and behaviors that affect risk of falls.   - [place] fall precautions as indicated by assessment.  - Educate pt/famil Spirometry  - Assess the need for suctioning and perform as needed  - Assess and instruct to report SOB or any respiratory difficulty  - Respiratory Therapy support as indicated  - Manage/alleviate anxiety  - Monitor for signs/symptoms of CO2 retention  Deb Green

## 2021-04-26 NOTE — PLAN OF CARE
Problem: Patient Centered Care  Goal: Patient preferences are identified and integrated in the patient's plan of care  Description: Interventions:  - What would you like us to know as we care for you?  \"I have had the marcum for a while\"  - Provide timel toileting schedule  Outcome: Progressing     Problem: DISCHARGE PLANNING  Goal: Discharge to home or other facility with appropriate resources  Description: INTERVENTIONS:  - Identify barriers to discharge w/pt and caregiver  - Include patient/family/disch care  - Consider collaborating with pharmacy to review patient's medication profile  - Implement strategies to promote bladder emptying  Outcome: Progressing     Problem: SKIN/TISSUE INTEGRITY - ADULT  Goal: Skin integrity remains intact  Description: INTE

## 2021-04-26 NOTE — CM/SW NOTE
SW following for discharge planning. SW reviewed patient's chart and commode was ordered via Aidin. SW entered commode order.  MOHINDER reviewed Aidin referral which E responded unavailable due to patient receiving a commode through insurance in 2020 and pat

## 2021-04-26 NOTE — CONSULTS
[facility] AND [facility] Infectious Disease  Report of Consultation    May Brew Patient Status:  Observation    1928 MRN [id number]   Location [facility] 5SW/SE Attending Carlos Schroeder MD   Hosp Day # 2 PCP Maria Eugenia Harrison MD     Da (MILK OF MAGNESIA) 400 MG/5ML suspension 30 mL, 30 mL, Oral, Daily PRN  •  bisacodyl (DULCOLAX) rectal suppository 10 mg, 10 mg, Rectal, Daily PRN  •  Fleet Enema (FLEET) 7-19 GM/118ML enema 133 mL, 1 enema, Rectal, Once PRN  •  ondansetron HCl (ZOFRAN) in past 24 hrs:   BP Temp Temp src Pulse Resp SpO2   04/26/21 1639 132/54 97.7 °F (36.5 °C) Oral — 20 96 %   04/26/21 0900 143/51 97.7 °F (36.5 °C) Oral — 19 94 %   04/26/21 0226 132/51 97.4 °F (36.3 °C) Oral 72 18 96 %   04/25/21 2005 118/32 97.6 °F (36.4 °C Kathryn Lindsey. Karen Julien [facility] Infectious Disease  [phone]    4/26/2021  6:29 PM

## 2021-04-26 NOTE — PROGRESS NOTES
Ambulance needed for DC.  Patient is confused,  [facility] set on will call 4/26/2021    Address:   [address] [place] [address]    PCS form in the chart  [facility] [phone]  Spoke with :Andie Rogers

## 2021-04-26 NOTE — PROGRESS NOTES
[facility] Urology   Progress Note  Dixie Guan Patient Status:  Inpatient    1928 MRN [id number]   Location [facility] 5SW/SE Attending Grayson Bhatia MD   Hosp Day # 2 PCP Alin Petty MD     Subjective:  No issues over n

## 2021-07-01 NOTE — ED QUICK NOTES
Called and spoke with Mahnaz Zavala, Pt's son, updated on pt's discharge orders and ETA on BLS pickup. He stated his understanding. Also called and left message for POA requesting call back to update her on discharge orders. Pt in bed. Bed is low and locked.  Call

## 2021-07-01 NOTE — ED INITIAL ASSESSMENT (HPI)
Pt to ED via EMS from home for complaint of possible UTI and lower abd pain per the care giver taking care of the patient. Pt has indwelling marcum in place prior to arrival with noted light yellow cloudy urine.  Caregiver states patient has been complaining

## 2021-07-01 NOTE — ED PROVIDER NOTES
Patient Seen in: [facility] AND [facility] Emergency Department    History   Patient presents with:  Abdomen/Flank Pain    Stated Complaint: abd pain, UTI    HPI    Patient presents with abdominal pain for 3 days according to paramedics.   Patient is a limited hi hypertension    • Renal disorder        Past Surgical History:   Procedure Laterality Date   • ANESTH,OPEN HEART SURGERY     • KNEE REPLACEMENT SURGERY     • REPAIR ING HERNIA,5+Y/O,REDUCIBL         Social History    Tobacco Use      Smoking status: Never perfusion. Respiratory:  Lungs clear to auscultation bilaterally with good effort. No wheezes, ronchi, or rales. Abdomen:  Soft, non-tender, non-distended. No masses, no hepato-splenomegaly.   Has indwelling Carrasco catheter   Musculoskeletal:  Good muscl at the hospital I discussed we did not he stated that is what the type the patient likes and he got it when he was in the hospital.  I discussed we do not have it in the emergency department.   Patient will be discharged once the IV is done    Tray storm there is left bundle branch block compared to previous there is no change.     MDM       Normal  Pulse oximetry    Cardiac Monitor: Normal sinus rhythm    Disposition and Plan     We recommend that you schedule follow up care with a primary care provider wi

## 2021-07-08 PROBLEM — R10.9 ABDOMINAL PAIN, ACUTE: Status: ACTIVE | Noted: 2021-01-01

## 2021-07-08 NOTE — H&P
[facility] Hospitalist H&P       CC: Nausea    PCP: Jayy Hernandez MD    History of Present Illness:   80year old male with history of diastolic CHF, COPD, hypertension, BPH, chronic marcum, who presents to the hospital for evaluation of nausea.  He was noted in hydroureteronephrosis extending from the renal calices to the urinary bladder. No urinary stones in either kidney, either ureter, or in the urinary bladder. The urinary bladder is collapsed around a Carrasco catheter.   The hydroureteronephrosis may be due t elevated  -clinically seems overload on exam. Give 1 dose of IV lasix     COPD  -nebulizer PRN for significant wheezing or dyspnea   -combivent at home, not on formulary     BPH with chronic marcum   -moderate left hydroureter. No renal stones.    -exchange

## 2021-07-08 NOTE — CONSULTS
[facility] HOSP - [facility]    Report of Consultation    Carmelita Pancho Patient Status:  Emergency    1928 MRN [id number]   Location [address] Attending Cayetano Rivera MD   Hosp Day # 0 PCP Pam Chávez MD     Date of PERRL  Neck: No JVD. Respiratory: Diminished breath sounds. Cardiovascular: S1, S2. No rubs  Abdomen: Soft, Obese + BS  Ext: 1+ LE edema  Carrasco catheter in place draining yellow urine.       Results:     Laboratory Data:  Lab Results   Component Value Da Impression:    80year old male with PMH of COPD, HTN, HF, BPH, presents to the hospital with abdominal pain, nausea and vomitting, found to have severe hyponatremia the reason for our consultation:    Severe Hyponatremia:  - Likely from combination

## 2021-07-08 NOTE — ED QUICK NOTES
Orders for admission, patient is aware of plan and ready to go upstairs.  Any questions, please call ED GUNJAN young  at extension [phone]  Type of COVID test sent: vaccinated - none ordered  COVID Suspicion level: Low    Titratable drug(s) infusing: NS  Rate: 5

## 2021-07-09 NOTE — PROGRESS NOTES
[facility] Hospitalist Progress Note     CC: Hospital Follow up    PCP: Sarah Snyder MD       Assessment/Plan:   80year old male with history of diastolic CHF, COPD, hypertension, BPH, chronic marcum, who presents to the hospital for evaluation of nausea.     58, temperature 96.6 °F (35.9 °C), temperature source Temporal, resp. rate 14, height 5' 10\" (1.778 m), weight 203 lb 4.8 oz (92.2 kg), SpO2 98 %.     Temp:  [96.4 °F (35.8 °C)-98.6 °F (37 °C)] 96.6 °F (35.9 °C)  Pulse:  [48-99] 58  Resp:  [11-24] 14  BP: urinary bladder. The urinary bladder is collapsed around a Carrasco catheter. The hydroureteronephrosis may be due to a recently passed stone however vesicoureteral reflux or urinary tract infection/pyelonephritis is suspected.  2.  There is a large quantity

## 2021-07-09 NOTE — SLP NOTE
ADULT SWALLOWING EVALUATION    ASSESSMENT    ASSESSMENT/OVERALL IMPRESSION:  PPE REQUIRED. THIS SLP WORE GOWN, GLOVES, AND DROPLET MASK. HANDS SANITIZED/WASHED UPON ENTRANCE/EXIT. SLP BSSE orders received and acknowledged.  A swallow evaluation warrante Precautions: Upright position; Slow rate;Small bites and sips; No straw  Medication Administration Recommendations: Crushed in puree  Treatment Plan/Recommendations: Aspiration precautions  Discharge Recommendations/Plan: Undetermined    HISTORY   MEDICAL HI Swallow: No complaints consistent with possible esophageal involvement        GOALS  Goal #1 The patient will tolerate PUREED consistency and TSP ONLY MILDLY THICKENED  liquids without overt signs or symptoms of aspiration with 100 % accuracy over 1-2 sess

## 2021-07-09 NOTE — PROGRESS NOTES
[facility] - [facility]    Progress Note    Kelly Bath Patient Status:  Inpatient    1928 MRN [id number]   Location [facility] 2W/SW Attending Sera Jorge, [address] [facility] Day # 1 PCP Seema Nuñez MD       Subjective:     Sodium levels im seen as a normal variant or with constipation. 3.  Cardiomegaly. Coronary atherosclerosis. Prior CABG. 4.  Short segments of airway occlusion/mucous plugging in the visualized lung bases suggesting bronchitis/asthma or aspiration.   Correlate for signs of

## 2021-07-09 NOTE — ED PROVIDER NOTES
Patient Seen in: [facility] 2w/sw    History   Patient presents with:  Bloating  Nausea    Stated Complaint: \"feels sick\"    HPI    Patient complains of mid  abdominal pain that began 3 days ago. Bloated. .  Pain described as crampy.   Pain rated negative except as noted above. PSFH elements reviewed from today and agreed except as otherwise stated in HPI.     Physical Exam     ED Triage Vitals   BP 07/08/21 1445 110/48   Pulse 07/08/21 1445 73   Resp 07/08/21 1445 18   Temp 07/08/21 1445 98 °F ( limits   OSMOLALITY, URINE - Abnormal; Notable for the following components:    Osmolality Urine 294 (*)     All other components within normal limits   URINALYSIS WITH CULTURE REFLEX - Abnormal; Notable for the following components:    Protein Urine 30  ( ORAL)(CPT=74176)    Result Date: 7/8/2021  CONCLUSION:  1. Moderate left hydroureteronephrosis extending from the renal calices to the urinary bladder. No urinary stones in either kidney, either ureter, or in the urinary bladder.   The urinary bladder is acute R10.9 7/8/2021

## 2021-07-09 NOTE — CM/SW NOTE
Patient discharged from [address] [place] [address] 4/2021 to home w/ family & [facility] ([phone]). Attempted to reach daughter/LORI Morgan no answer, vmail left. Reviewed SW assessment from previous admission.  Patient lives with family who provides 24/7 supervisi

## 2021-07-09 NOTE — PROGRESS NOTES
Patient arrived from ER on cart, no family at bedside. Patient Alert to self, very difficult to understand patient when he speaks. English is his second language and patient mumbles. Patient arrived in [place] with a BBB.  RRT was called because patient went

## 2021-07-10 NOTE — PHYSICAL THERAPY NOTE
PHYSICAL THERAPY EVALUATION - INPATIENT     Room Number: 550/550-A  Evaluation Date: 7/10/2021  Type of Evaluation: Initial   Physician Order: PT Eval and Treat    Presenting Problem: abdominal distention, hyponatremia  Reason for Therapy: Mobility Dysfun transfers, which are below the patient's pre-admission status. The patient's Approx Degree of Impairment: 86.62% has been calculated based on documentation in the [facility] ER '6 clicks' Inpatient Basic Mobility Short Form.   Research supports that patients w Past Surgical History  Past Surgical History:   Procedure Laterality Date   • ANESTH,OPEN HEART SURGERY     • KNEE REPLACEMENT SURGERY     • REPAIR ING HERNIA,5+Y/O,REDUCIBL         HOME SITUATION  Type of Home: House                    Lives With: F Exercise/Education Provided:  Bed mobility  Body mechanics  Energy conservation  Functional activity tolerated  Neuromuscular re-educate  Posture  ROM  Strengthening  Transfer training    Patient End of Session: Up in chair;Needs met;Call light within

## 2021-07-10 NOTE — CONSULTS
Inpatient Consultation - URINARY RETENTION      Place of Service: [facility]   Reason for Consultation:  Chronic Urinary Retention  Consult Requested by: Dr. Carolina Claudio    History of Present Illness: Stanislaw Butcher is a 80year old male with chronic urina Radiology Data Registry) which includes the Dose Index Registry.       FINDINGS:   KIDNEYS:   Moderate left hydroureteronephrosis extending from the renal calices to the urinary bladder.  No urinary stones in the kidneys, ureters, or bladder.  Scattered astorga NODES: No enlarged mass or adenopathy.     PELVIC ORGANS: No visible mass.  Pelvic organs appropriate for patient age.     BONES:   Moderate endplate change and disc disease within the spine.  Sternotomy wires are present.    LUNG BASES: The heart is border REPAIR ING HERNIA,5+Y/O,REDUCIBL         History reviewed. No pertinent family history.     Social History    Socioeconomic History      Marital status:       Spouse name: Not on file      Number of children: Not on file      Years of education: Not by mouth nightly., Disp: , Rfl:   Doxepin HCl 10 MG Oral Cap, Take 10 mg by mouth nightly., Disp: , Rfl:   ferrous sulfate 325 (65 FE) MG Oral Tab EC, Take 325 mg by mouth daily with breakfast., Disp: , Rfl:   spironolactone 25 MG Oral Tab, Take 25 mg by m no rashes or lesions  Psychological: alert and oriented, answers questions appropriately    Assessment/Plan: Jayla Siu is being seen for chronic urinary retention - managed with indwelling catheter and left hydroureter      1.  Chronic urinary retention

## 2021-07-10 NOTE — PLAN OF CARE
Problem: Patient Centered Care  Goal: Patient preferences are identified and integrated in the patient's plan of care  Description: Interventions:  - What would you like us to know as we care for you?  My daughter takes care of me  - Provide timely, compl arrhythmias  - Monitor electrolytes and administer replacement therapy as ordered  Outcome: Progressing     Problem: METABOLIC/FLUID AND ELECTROLYTES - ADULT  Goal: Electrolytes maintained within normal limits  Description: INTERVENTIONS:  - Monitor labs a Encourage toileting schedule  Outcome: Progressing     Problem: DISCHARGE PLANNING  Goal: Discharge to home or other facility with appropriate resources  Description: INTERVENTIONS:  - Identify barriers to discharge w/pt and caregiver  - Include patient/fa

## 2021-07-10 NOTE — SLP NOTE
SPEECH DAILY NOTE - INPATIENT    ASSESSMENT & PLAN   ASSESSMENT  PPE REQUIRED. THIS SLP WORE GOGGLES, GLOVES, AND DROPLET MASK. HANDS SANITIZED/WASHED UPON ENTRANCE/EXIT. SLP f/u for ongoing meal assessment.   RN reports pt tolerates diet and medication patient will utilize compensatory strategies as outlined by  BSSE (clinical evaluation) including Slow rate, Small bites, No straws, Upright 90 degrees, Upright 90 degrees 30 mins after meal, Liquids via tsp amount only, Feed patient with 1:1 FEED  assista

## 2021-07-10 NOTE — PLAN OF CARE
Patient alert oriented x3. Very garbled speech, forgetful, [place]ut[place] and english-speaking. Daughter/caregiver at bedside at beginning of shift. Patient denies pain and SOB at this time. Takes medications with applesauce. Swallowing precautions maintained.  Re fluid balance, assess for edema, trend weights  Outcome: Progressing  Goal: Absence of cardiac arrhythmias or at baseline  Description: INTERVENTIONS:  - Continuous cardiac monitoring, monitor vital signs, obtain 12 lead EKG if indicated  - Evaluate effect risk of falls.   - [place] fall precautions as indicated by assessment.  - Educate pt/family on patient safety including physical limitations  - Instruct pt to call for assistance with activity based on assessment  - Modify environment to reduce risk of i Progressing

## 2021-07-10 NOTE — PROGRESS NOTES
[place] [facility]    Progress Note    Arielle Green Patient Status:  Inpatient    1928 MRN [id number]   Location [facility] 2W/SW Attending Zander Benitez, [address] [facility] Day # 2 PCP Sandra Calderon MD       Subjective:     Sodium levels im dilatation. This can be seen as a normal variant or with constipation. 3.  Cardiomegaly. Coronary atherosclerosis. Prior CABG. 4.  Short segments of airway occlusion/mucous plugging in the visualized lung bases suggesting bronchitis/asthma or aspiration. Group  Nephrology

## 2021-07-10 NOTE — PROGRESS NOTES
[facility] Hospitalist Progress Note     CC: Hospital Follow up    PCP: Pam Chávez MD       Assessment/Plan:   80year old male with history of diastolic CHF, COPD, hypertension, BPH, chronic marcum, who presents to the hospital for evaluation of nausea.     118/53      Intake/Output:    Intake/Output Summary (Last 24 hours) at 7/10/2021 1023  Last data filed at 7/10/2021 0849  Gross per 24 hour   Intake 340 ml   Output 1350 ml   Net -1010 ml       Last 3 Weights  07/09/21 0549 : 203 lb 4.8 oz (92.2 kg)  07/08 There is a large quantity of fecal material throughout the large bowel without small bowel dilatation. This can be seen as a normal variant or with constipation. 3.  Cardiomegaly. Coronary atherosclerosis. Prior CABG.  4.  Short segments of airway occlusi

## 2021-07-11 NOTE — OCCUPATIONAL THERAPY NOTE
Communication Note to Team    Acknowledge & appreciate OT order. Chart Reviewed: Admitted with nausea. Per EMR & discussion with RN - pt has 24 hr A; He has a wheelchair, walker, [facility] & hospital bed.  Req total assist with transfers & ADLs (able to self f

## 2021-07-11 NOTE — PROGRESS NOTES
[place] [facility]    Progress Note    Tanner Bad Patient Status:  Inpatient    1928 MRN [id number]   Location [facility] 2W/SW Attending Mellisa Jesus, [address] [facility] Day # 3 PCP Harshil Jovel MD       Subjective:     Sodium levels im exchange and left hydronpehrosis. - avoid nephrotoxins  - Low urinary sodium levels. Likely has CRS.    Hyperkalemia:  - from combination of NANCY, bactrim and spironolactone.   - Holding above at this time.     HF:  - Strict ins/outs  - daily standing weig

## 2021-07-11 NOTE — PLAN OF CARE
Pt denies pain, shorten of breath, chills, fever, nausea, vomiting. Medication reviewed. Vital signs monitor. Safety maintained. Call light in reach. We will continue to monitor.   Problem: Patient Centered Care  Goal: Patient preferences are identified and signs, obtain 12 lead EKG if indicated  - Evaluate effectiveness of antiarrhythmic and heart rate control medications as ordered  - Initiate emergency measures for life threatening arrhythmias  - Monitor electrolytes and administer replacement therapy as o that affect risk of falls.   - [place] fall precautions as indicated by assessment.  - Educate pt/family on patient safety including physical limitations  - Instruct pt to call for assistance with activity based on assessment  - Modify environment to redu understanding and response  - Establish method for patient to ask for assistance (call light)  - Provide an  as needed  - Communicate barriers and strategies to overcome with those who interact with patient  7/10/2021 2239 by Jose Valero RN

## 2021-07-11 NOTE — PROGRESS NOTES
[facility] Hospitalist Progress Note     CC: Hospital Follow up    PCP: Seema Nuñez MD       Assessment/Plan:   80year old male with history of diastolic CHF, COPD, hypertension, BPH, chronic marcum, who presents to the hospital for evaluation of nausea.     117/57      Intake/Output:    Intake/Output Summary (Last 24 hours) at 7/11/2021 0955  Last data filed at 7/11/2021 0819  Gross per 24 hour   Intake 800 ml   Output 1250 ml   Net -450 ml       Last 3 Weights  07/11/21 0621 : 191 lb 12.8 oz (87 kg)  07/09/2 reflux or urinary tract infection/pyelonephritis is suspected. 2.  There is a large quantity of fecal material throughout the large bowel without small bowel dilatation. This can be seen as a normal variant or with constipation. 3.  Cardiomegaly.   Coronary

## 2021-07-12 NOTE — DISCHARGE SUMMARY
General Medicine Discharge Summary     Patient ID:  Brady Moreau  80year old  9/28/1928    Admit date: 7/8/2021    Discharge date and time: 7/12/21    Attending Physician: DO Na Sweet marcum   -moderate left hydroureter. No renal stones. -monitor for signs of sepsis.  UA with low WBCs so unlikely acute UTI  -hold antibiotics for now      Consults:   IP CONSULT TO NEPHROLOGY  IP CONSULT TO UROLOGY    Patient instructions:      Discharge

## 2021-07-12 NOTE — PROGRESS NOTES
[facility]    Progress Note      Subjective:     Resting comfortably in bed, no complaints  Denies SOB     Objective:   Vital Signs:  Blood pressure 116/39, pulse 66, temperature 97.5 °F (36.4 °C), temperature source Oral, resp.  rate 20, he remains on lasix 40 IV daily   - anticipate discharge on lasix 40 mg daily (home dose)  - spironolactone currently on hold, can resume if sodium levels remain stable. Dispo: await repeat Na level.   Potential discharge later today if repeat Na level >

## 2021-07-12 NOTE — SLP NOTE
SPEECH DAILY NOTE - INPATIENT    ASSESSMENT & PLAN   ASSESSMENT  PPE REQUIRED. THIS SLP WORE GOGGLES, GLOVES, AND DROPLET MASK. HANDS SANITIZED/WASHED UPON ENTRANCE/EXIT.      Pt upright in bed for PM meal. Pt weaned from 2L/MIN O2NC from previous session t Liquids via tsp amount only, Feed patient with 1:1 FEED  assistance 100 % of the time across 1-2 sessions. SLP implements safe swallowing compensatory strategies for 100% meals. Goal met          FOLLOW UP  Follow Up Needed (Documentation Required):  No

## 2021-07-12 NOTE — PROGRESS NOTES
[facility] Hospitalist Progress Note     CC: Hospital Follow up    PCP: Jayy Hernandez MD       Assessment/Plan:   80year old male with history of diastolic CHF, COPD, hypertension, BPH, chronic marcum, who presents to the hospital for evaluation of nausea.     20, height 5' 10\" (1.778 m), weight 191 lb 12.8 oz (87 kg), SpO2 96 %.     Temp:  [97.5 °F (36.4 °C)-98.3 °F (36.8 °C)] 97.5 °F (36.4 °C)  Pulse:  [66-76] 66  Resp:  [18-20] 20  BP: ()/(36-41) 116/39      Intake/Output:    Intake/Output Summary (Last • Senna  8.6 mg Oral BID   • ARIPiprazole  20 mg Oral Daily   • Doxepin HCl  10 mg Oral Nightly   • Flecainide Acetate  100 mg Oral Daily   • Pantoprazole Sodium  40 mg Oral QAM AC   • QUEtiapine Fumarate  25 mg Oral Nightly       acetaminophen, ondanset

## 2021-07-12 NOTE — PROGRESS NOTES
Patient requesting ambulance for discharge.  Ambulance for confusion/max 2     Address:   [address] [place] [id number]      PCS form in the chart  [facility] [phone]  Spoke with :Kadie Espinoza

## 2021-07-12 NOTE — PLAN OF CARE
Pt denies shorten of breath, pain, nausea, vomiting, diarrhea. Medication reviewed. Safety maintained. Vital signs monitored. Call light in reach. We will continue to monitor.   Problem: Patient Centered Care  Goal: Patient preferences are identified and in obtain 12 lead EKG if indicated  - Evaluate effectiveness of antiarrhythmic and heart rate control medications as ordered  - Initiate emergency measures for life threatening arrhythmias  - Monitor electrolytes and administer replacement therapy as ordered assessment  - Modify environment to reduce risk of injury  - Provide assistive devices as appropriate  - Consider OT/PT consult to assist with strengthening/mobility  - Encourage toileting schedule  Outcome: Progressing     Problem: [address]

## 2021-07-12 NOTE — DISCHARGE PLANNING
Patient has discharge order in. Patient to discharge to home. IV removed by this RN. Understands to follow up with nephrology  in 2 week. Discharge papers discussed with daughter while at bedside.  Patients daughter understands to hold the aldactone until l

## 2021-08-09 PROBLEM — I21.9 ACUTE MYOCARDIAL INFARCTION (HCC): Status: ACTIVE | Noted: 2021-01-01

## 2021-08-09 PROBLEM — I21.9 ACUTE MYOCARDIAL INFARCTION, UNSPECIFIED MI TYPE, UNSPECIFIED ARTERY (HCC): Status: ACTIVE | Noted: 2021-01-01

## 2021-08-09 PROBLEM — I21.3 STEMI (ST ELEVATION MYOCARDIAL INFARCTION) (HCC): Status: ACTIVE | Noted: 2021-01-01

## 2021-08-09 PROBLEM — I50.9 CONGESTIVE HEART FAILURE, UNSPECIFIED HF CHRONICITY, UNSPECIFIED HEART FAILURE TYPE (HCC): Status: ACTIVE | Noted: 2021-01-01

## 2021-08-09 NOTE — PROGRESS NOTES
Pt would like to confess to a .  This  submitted request for Tuesday am.  As of time of request, pt was still in ED and room had not yet been assigned, will follow up in morning to attach correct room number to request.     08/09/21 1650   Cl

## 2021-08-09 NOTE — CONSULTS
Cardiology (consult dictated)    Assessment:  1. Appears to be having an acute anterior wall MI. Patient has borderline blood pressure and evidence of congestive heart failure. He is diaphoretic. Very poor historian.   Main complaint is pain in the pubi

## 2021-08-09 NOTE — H&P
[facility] Hospitalist H&P       CC: Patient presents with:  Difficulty Breathing       PCP: Carloz Phan MD    History of Present Illness: Patient is a 80year old male with PMH sig for CVA, CKD, HTN, COPD, CHF who presents with weakness.   Today son was [facility] abnormality, atraumatic. Eyes:  Sclera anicteric, No conjunctival pallor, EOMs intact. Nose: Mask in place    Throat: Mask in place    Neck: Supple, symmetrical, trachea midline, no LAD   Lungs:   Mild wheezes with course breath sounds.  Increased effo nephrology on consult     Acute kidney injury  - Cr from 1.01 to 1.89  - obtained urine studies  - continue lasix   - likely cardiorenal     BPH with chronic marcum   UTI  - UA positive  - will follow cultures   - Ceftriaxone day 1   - marcum changed today o

## 2021-08-09 NOTE — ED QUICK NOTES
Orders for admission, patient is aware of plan and ready to go upstairs. Any questions, please call ED RN Haley Tao  at extension [phone].    Type of COVID test sent: Rapid (-)  COVID Suspicion level: low Low/High    Titratable drug(s) infusing: Heparin gtt  Ra

## 2021-08-09 NOTE — CONSULTS
NEPHROLOGY CONSULT NOTE     DATE: 8/9/2021    Requesting Physician: Dr. Ronaldo Lemus     Reason for Consult: ED consult       HISTORY OF PRESENT ILLNESS: Li Maradiaga is a 80year old male with history of diastolic CHF, CAD s/p CABG, COPD, HTN and urinary retenti Activity      Alcohol use: Never      Drug use: Never      Sexual activity: Not on file    Other Topics      Concerns:        Not on file    Social History Narrative      Not on file    Social Determinants of Health  Financial Resource Strain:       Diffic hemolysis. Test reordered by laboratory.       CL 85 (L) 08/09/2021    CO2 20.0 (L) 08/09/2021    BUN 41 (H) 08/09/2021    CREATSERUM 1.89 (H) 08/09/2021      Lab Results   Component Value Date    WBC 7.1 08/09/2021    HB Auto Resulted 08/09/2021    HCT 31 follow. Graciela Anna, [address] [facility] - Nephrology  8/9/2021

## 2021-08-09 NOTE — ED PROVIDER NOTES
Patient Seen in: [facility] AND [facility] Emergency Department    History   Patient presents with:  Difficulty Breathing    Stated Complaint: Son trasnferring pt via james lift. Pt appeared \"out of it\" when returning back*    HPI    Patient brought by EMS.   A by mouth 2 (two) times daily. Take 40mg twice daily   Ipratropium-Albuterol (COMBIVENT IN),  Inhale 1 puff into the lungs 3 (three) times daily as needed. QUEtiapine Fumarate 25 MG Oral Tab,  Take 25 mg by mouth nightly.    Doxepin HCl 10 MG Oral Cap,  Ta abdomen is soft and non tender, no masses, nl bowel sounds   EXTREMITIES:2+ edema, moves arms purposefully  NEURO:awake answering simple questions  SKIN: good skin turgor, no  rashes  PSYCH: calm, cooperative,    Differential includes:acute mi vs. lbbb wit ACTIVATED - Normal   RAPID SARS-COV-2 BY PCR - Normal   CBC WITH DIFFERENTIAL WITH PLATELET    Narrative: The following orders were created for panel order CBC With Differential With Platelet.   Procedure                               Abnormality Disposition and Plan     Clinical Impression:  Acute myocardial infarction, unspecified MI type, unspecified artery ([facility]  (primary encounter diagnosis)  Congestive heart failure, unspecified HF chronicity, unspecified heart failure type ([facility]  Hyp

## 2021-08-09 NOTE — ED INITIAL ASSESSMENT (HPI)
Pt to ED via [facility]. Per EMS son transferring pt via [employer] lift and pt seemed \"out of it\". Per EMS original call was for full arrest, however pt alert upon EMS arrival. Pt here on 3L O2 baseline and marcum in place.  Primarily Italian speaking per EM

## 2021-08-10 PROBLEM — Z71.89 GOALS OF CARE, COUNSELING/DISCUSSION: Status: ACTIVE | Noted: 2021-01-01

## 2021-08-10 PROBLEM — Z71.89 ADVANCE CARE PLANNING: Status: ACTIVE | Noted: 2021-01-01

## 2021-08-10 NOTE — SLP NOTE
ADULT SWALLOWING EVALUATION    ASSESSMENT    ASSESSMENT/OVERALL IMPRESSION:  PPE REQUIRED. THIS SLP WORE GOGGLES, GLOVES, AND DROPLET MASK. HANDS SANITIZED/WASHED UPON ENTRANCE/EXIT. SLP BSSE orders received and acknowledged.  A swallow evaluation Andrew Giraldo RECOMMENDATIONS   Diet Recommendations - Solids: Puree  Diet Recommendations - Liquids: Nectar thick liquids/ Mildly thick (via tsp or controlled cup sips )  1:1 FEEDING ASSISTANCE   FEED WHEN FULLY AWAKE/ALERT  STOP ANY ORAL FEEDING IF ANY OVERT CSA EXAMINATION  Dentition: Edentulous  Symmetry: Within Functional Limits  Strength:  Within Functional Limits  Tone: Within Functional Limits  Range of Motion: Within Functional Limits  Rate of Motion: Within Functional Limits    Voice Quality: Clear  [place]pira

## 2021-08-10 NOTE — PROGRESS NOTES
[facility] Hospitalist Progress Note     CC: Hospital Follow up    PCP: Rachel Rice MD       Assessment/Plan:     Principal Problem:    Acute myocardial infarction, unspecified MI type, unspecified artery ([address]  Active Problems:    Hyponatremia    Acute myocar IVF: hold  - Diet: cardiac      DVT Prophy: heparin drip  Atrophy: PT  Lines: Piv  Code: DNR/DNI, no shock or chest compression, okay with pressors, confirmed with daughters at bedside.      Dispo: pending clinical course       Patient and/or patient's fam 31.7* 29.1*   MCV 94.9 93.3   MCH 32.3 31.1   MCHC 34.1 33.3   RDW 12.9 12.9   NEPRELIM 4.56 4.83   WBC 7.1 6.7   .0 201.0         Recent Labs   Lab 08/09/21  1247 08/09/21  1404 08/09/21  2004 08/10/21  0533   *  --  118* 97   BUN 41*  --  4

## 2021-08-10 NOTE — PROGRESS NOTES
[facility] Note:  Renal Dose Adjustment for Metoclopramide (REGLAN)    Kwadwo Serrano has been prescribed Metoclopramide (REGLAN) 10 mg every 8 hours as needed for nausea/vomiting,.     Estimated Creatinine Clearance: 23.3 mL/min (A) (based on SCr of 1.89

## 2021-08-10 NOTE — PHYSICAL THERAPY NOTE
Per RN, patient bed bound at home, has lift and is at his baseline. RN to D/C orders. Will D/C from physical therapy caseload at this time. RN aware and agreeable.

## 2021-08-10 NOTE — PLAN OF CARE
Pt received from ED. Here for Vtachs and acute MI. Heparin gtt infusing. Pt denies chest discomfort or shortness of breath. PRN neb treatment given for wheezing. POA (Cathy) at bedside overnight and very involved in patient care. HR vtach to aflutter.  NP additional Care Plan goals for specific interventions  Outcome: Progressing     Problem: CARDIOVASCULAR - ADULT  Goal: Maintains optimal cardiac output and hemodynamic stability  Description: INTERVENTIONS:  - Monitor vital signs, rhythm, and trends  - Mon intact  Description: INTERVENTIONS  - Assess and document risk factors for pressure ulcer development  - Assess and document skin integrity  - Monitor for areas of redness and/or skin breakdown  - Initiate interventions, skin care algorithm/standards of ca

## 2021-08-10 NOTE — CM/SW NOTE
07: 55AM  Received MDO for POLST. SW reviewed pt's Epic chart - completed and valid POLST form available from 4/27/2021. Per uploaded POLST form, pt is [facility]. Pt is a READMISSION and was last [address] [place] [address] home w/ [facility] on 7/9/2021.  Pt is from home

## 2021-08-10 NOTE — CONSULTS
[address] [place] [address] Patient Status:  Inpatient    1928 MRN [id number]   Location [facility] 3W/SW Attending Sherry Jesus, [address] [facility] Day # 1 PCP Liborio Velazquez MD     Date of Consult of Substance Use/Abuse: none    Allergies:  No Known Allergies    Subjective/Objective: Patient seen in bed with his daughter/KEON Morgan at the bedside. He has a frequent, congested sounding cough.  I spoke to him in [place] Island[place] but I could not understand hi of breath. I discussed the potential adverse effects including confusion, hypotension, nausea, and constipation. At this time, Timmy Marshall does NOT want me to add any low dose morphine or cough syrup PRN.      Goals of Care discussion/Advance Care Planning coun (SEROQUEL) tab 25 mg, 25 mg, Oral, Nightly  •  cloNIDine (CATAPRES) tab 0.1 mg, 0.1 mg, Oral, BID PRN  No current outpatient medications on file.       Hematology:  Lab Results   Component Value Date    WBC 6.7 08/10/2021    HGB 9.7 (L) 08/10/2021    HCT 29 40%    Advance Care Directives:    Have advanced directives been discussed with patient or healthcare power of : Yes        Healthcare Agent Appointed: Yes  Healthcare Agent's Name: Janay Schwartz (daughter)  Healthcare Agent's Phone Number: [phone] supportive medical treatments that he is getting including the heparin continuous IV infusion and IV Lasix. We talked about hoping for the best but taking things day by day to see how Danyel's body is able to respond to the treatments.     While Tashi today's visit with Dr. Afshin Gonzalez. Thank you for allowing the Palliative Care Services team to participate in the care of your patient. Will continue to follow.       LILLIAN Grande, [facility] CenterNP[facility]BC, [facility], [id number]  8/10/2021  11:43 AM

## 2021-08-10 NOTE — DIETARY NOTE
ADULT NUTRITION INITIAL ASSESSMENT    Pt is at low nutrition risk. Pt does not meet malnutrition criteria.       RECOMMENDATIONS TO MD:  See Nutrition Intervention    ADMITTING DIAGNOSIS:   Hyponatremia [E87.1]  Acute myocardial infarction, unspecified MI awake  - Nutrition education: assess education needs  - Coordination of nutrition care: collaboration with other providers  - Discharge and transfer of nutrition care to new setting or provider: monitor plans    ANTHROPOMETRICS:  HT:  5'7\"  WT: 93.8 kg (2 risk.  .nutr  MONITOR AND EVALUATE/NUTRITION GOALS:  - Food and Nutrient Intake:      Monitor: adequacy of PO intake, tolerance of PO intake, adequacy of supplement intake and tolerance of supplement intake.   - Anthropometric Measurement:      Monitor riddhi

## 2021-08-10 NOTE — PROGRESS NOTES
Assessment and Plan:     1. Acute myocardial infarction  - conservative management per discussion 8/9  - discussed and reinforced medical management with daughter today  2.  CAD  - previous CABG at [address] [place] [address] 8 years ago  - previous PCI  3. HFrEF  - EF 25 Peewee Major MD on 8/09/2021 at 1:27 PM     Finalized by (CST): Peewee Major MD on 8/09/2021 at 1:29 PM          EKG    Result Date: 8/9/2021  ECG Report  Interpretation  -------------------------- POSSIBLE VENTRICULAR TACHYCARDIA Dif

## 2021-08-10 NOTE — PROGRESS NOTES
Added pt to [employer] for [employer] today.    08/10/21 0820   Clinical Encounter Type   Visited With Patient not available   [place]avian Encounters   Spiritual Requests During Visit / Hospitalization [address] [place] [address]

## 2021-08-10 NOTE — PROGRESS NOTES
NEPHROLOGY DAILY PROGRESS NOTE     Follow up Reason:  Hyponatremia     SUBJECTIVE:  Asking for water   Reports breathing is bad  Denies CP   Daughter at bedside     OBJECTIVE:    Total Intake/Output:    Intake/Output Summary (Last 24 hours) at 8/10/2021 09 Nightly  cloNIDine (CATAPRES) tab 0.1 mg, 0.1 mg, Oral, BID PRN        LABS:  Patient Labs Reviewed in Detail.  Pertinent Labs as follows:  Lab Results   Component Value Date     (LL) 08/10/2021    K 5.0 08/10/2021    CL 87 (L) 08/10/2021    CO2 26.0

## 2021-08-10 NOTE — CONSULTS
[facility]    PATIENT'S NAME: Dav Denney   ATTENDING PHYSICIAN: Andrew Newton MD   CONSULTING PHYSICIAN: Kirill Cruz.  Blossom Carpio MD   PATIENT ACCOUNT#:   [id number]    LOCATION:  [address] 1  MEDICAL RECORD #:   [id number]       DATE OF BIRTH: is also somewhat surprising that he is not on any antiplatelet agents and not on a statin. We used the language line, but the patient gave only grunts for answers.   His Carrasco catheter was draining purulent material.  When we changed the Carrasco, there was n treat him with maximal medical therapy at this time. Transition to palliative care would be done at the appropriate time. Thank you for this consultation. Dictated By Tristan Herron.  Deisy Seymour MD  d: 08/09/2021 13:43:47  t: 08/09/2021 14:55:47  [facility] [id number]

## 2021-08-10 NOTE — PROGRESS NOTES
Pt was seen by this  previous day in ED. Pt speaks primarily South African, is requesting  to confess. Added pt's name to  requests, Fr. Hu, who speaks South African, will be here today.      08/10/21 0849   Clinical Encounter Type   Visited

## 2021-08-11 NOTE — PROGRESS NOTES
Pt's daughter was present upon arrival, several more family members arrived soon after. This  had been with family upon pt's arrival via ED previous day. Pt had received Sacrament of Anointing earlier in the day.   Provided empathic presence and p

## 2021-08-13 NOTE — DISCHARGE SUMMARY
General Medicine Discharge Summary     Patient ID:  Francesca Trejo  80year old  9/28/1928    Admit date: 8/9/2021    Discharge date and time: 8/10/2021 11:42 PM \    Attending Physician: Dr. Wong Mena: Chantelle Queen derangements. Patient's condition continued to worsen given existing STEMI without intervention. Palliative care was on consult. CODE STATUS confirmed to DNR select. In the morning patient and family not ready for hospice.   Later in the evening, presum
